# Patient Record
Sex: FEMALE | Race: WHITE | NOT HISPANIC OR LATINO | Employment: OTHER | URBAN - METROPOLITAN AREA
[De-identification: names, ages, dates, MRNs, and addresses within clinical notes are randomized per-mention and may not be internally consistent; named-entity substitution may affect disease eponyms.]

---

## 2018-06-28 PROBLEM — E87.70 LOCALIZED EDEMA DUE TO FLUID OVERLOAD: Status: ACTIVE | Noted: 2018-06-28

## 2018-06-28 PROBLEM — E11.65 TYPE 2 DIABETES MELLITUS WITH HYPERGLYCEMIA, WITHOUT LONG-TERM CURRENT USE OF INSULIN (HCC): Status: ACTIVE | Noted: 2018-06-28

## 2018-06-28 PROBLEM — E11.65 UNCONTROLLED TYPE 2 DIABETES MELLITUS WITH HYPERGLYCEMIA, WITHOUT LONG-TERM CURRENT USE OF INSULIN (HCC): Status: ACTIVE | Noted: 2018-06-28

## 2018-06-28 PROBLEM — R26.2 AMBULATORY DYSFUNCTION: Status: ACTIVE | Noted: 2018-06-28

## 2018-08-02 ENCOUNTER — IN HOME VISIT (OUTPATIENT)
Dept: GERIATRICS | Age: 83
End: 2018-08-02
Payer: MEDICARE

## 2018-08-02 VITALS
HEART RATE: 75 BPM | TEMPERATURE: 98.2 F | OXYGEN SATURATION: 97 % | RESPIRATION RATE: 20 BRPM | DIASTOLIC BLOOD PRESSURE: 84 MMHG | SYSTOLIC BLOOD PRESSURE: 112 MMHG

## 2018-08-02 DIAGNOSIS — K59.1 FUNCTIONAL DIARRHEA: Primary | ICD-10-CM

## 2018-08-02 DIAGNOSIS — E11.65 UNCONTROLLED TYPE 2 DIABETES MELLITUS WITH HYPERGLYCEMIA, WITHOUT LONG-TERM CURRENT USE OF INSULIN (HCC): ICD-10-CM

## 2018-08-02 PROCEDURE — 99335 PR DOM/R-HOME E/M EST PT LW MOD SEVERITY 25 MINUTES: CPT | Performed by: INTERNAL MEDICINE

## 2018-08-02 NOTE — PROGRESS NOTES
Assessment/Plan:    Functional diarrhea  Continue prn lopermide for now  Resident also had sertraline started few months back which can be causing the symptoms  She also on high dose of metformin will try decreasing it  Uncontrolled type 2 diabetes mellitus with hyperglycemia, without long-term current use of insulin ([facility] [address] )  Residents had her repaglinide d/c already due to episodes of hypoglycemia in the am  Since discontinuing it her blood sugars are improved in the am  And now since decreasing the metformin will increase lantus to 17 units  Continue monitor blood sugars  Subjective:      Patient ID: Laura Yo is a 82 y o  female  HPI    The following portions of the patient's history were reviewed and updated as appropriate: allergies, current medications, past family history, past medical history, past social history, past surgical history and problem list     Review of Systems   Constitutional:        Due to dementia resident forgets about any problem she has   Gastrointestinal:        Staff reports she has at least 3 bowel movements a day and very loose but not watery  All other systems reviewed and are negative  Objective:      /84 (BP Location: Right arm, Patient Position: Sitting)   Pulse 75   Temp 98 2 °F (36 8 °C)   Resp 20   SpO2 97%          Physical Exam   Constitutional: She appears well-developed and well-nourished  No distress  HENT:   Head: Normocephalic and atraumatic  Right Ear: External ear normal    Left Ear: External ear normal    Nose: Nose normal    Eyes: Conjunctivae and EOM are normal  Pupils are equal, round, and reactive to light  Right eye exhibits no discharge  Left eye exhibits no discharge  Neck: Normal range of motion  Neck supple  No JVD present  No tracheal deviation present  No thyromegaly present  Cardiovascular: Normal rate, regular rhythm, S1 normal and S2 normal     Murmur heard     Crescendo decrescendo systolic murmur is present with a grade of 2/6   Pulmonary/Chest: Effort normal and breath sounds normal  No respiratory distress  She has no wheezes  She has no rales  Abdominal: Soft  Bowel sounds are normal  She exhibits no distension  There is no tenderness  There is no rebound  Musculoskeletal:   Bilateral lower extremity edema about +2 pitting in nature  Neurological: She is alert  No cranial nerve deficit  Skin: Skin is warm and dry  No rash noted  She is not diaphoretic  No erythema  Psychiatric: Her affect is blunt  Her speech is delayed  She is slowed  Cognition and memory are impaired  She exhibits a depressed mood  She exhibits abnormal recent memory and abnormal remote memory

## 2018-08-02 NOTE — ASSESSMENT & PLAN NOTE
Residents had her repaglinide d/c already due to episodes of hypoglycemia in the am  Since discontinuing it her blood sugars are improved in the am  And now since decreasing the metformin will increase lantus to 17 units  Continue monitor blood sugars

## 2018-08-02 NOTE — ASSESSMENT & PLAN NOTE
Continue prn lopermide for now  Resident also had sertraline started few months back which can be causing the symptoms  She also on high dose of metformin will try decreasing it

## 2018-10-25 ENCOUNTER — IN HOME VISIT (OUTPATIENT)
Dept: GERIATRICS | Age: 83
End: 2018-10-25
Payer: MEDICARE

## 2018-10-25 VITALS
TEMPERATURE: 98.7 F | HEART RATE: 83 BPM | DIASTOLIC BLOOD PRESSURE: 74 MMHG | OXYGEN SATURATION: 99 % | SYSTOLIC BLOOD PRESSURE: 148 MMHG | RESPIRATION RATE: 20 BRPM

## 2018-10-25 DIAGNOSIS — E11.65 UNCONTROLLED TYPE 2 DIABETES MELLITUS WITH HYPERGLYCEMIA, WITHOUT LONG-TERM CURRENT USE OF INSULIN (HCC): Primary | ICD-10-CM

## 2018-10-25 DIAGNOSIS — E87.70 LOCALIZED EDEMA DUE TO FLUID OVERLOAD: ICD-10-CM

## 2018-10-25 PROCEDURE — 99336 PR DOM/R-HOME E/M EST PT MOD HI SEVERITY 40 MINUTES: CPT | Performed by: INTERNAL MEDICINE

## 2018-10-25 NOTE — ASSESSMENT & PLAN NOTE
Blood sugars reviewed which show she is still running high  Discussed with family about food and they are aware of it that the food they bring at times makes the sugar go up but states that at times she does not eat food at the facility because she does not like it  Informed them that increase the lantus to 20units  Will continue to monitor blood sugars

## 2018-10-25 NOTE — ASSESSMENT & PLAN NOTE
Increased lasix to 40mg po daily x 2 weeks then go back down to 20mg po daily  Will continue with the stockings  Also ordered a BMP in about 2 weeks

## 2018-10-25 NOTE — PROGRESS NOTES
Assessment/Plan:    Uncontrolled type 2 diabetes mellitus with hyperglycemia, without long-term current use of insulin ([facility])  Blood sugars reviewed which show she is still running high  Discussed with family about food and they are aware of it that the food they bring at times makes the sugar go up but states that at times she does not eat food at the facility because she does not like it  Informed them that increase the lantus to 20units  Will continue to monitor blood sugars  Localized edema due to fluid overload  Increased lasix to 40mg po daily x 2 weeks then go back down to 20mg po daily  Will continue with the stockings  Also ordered a BMP in about 2 weeks  Subjective:      Patient ID: Shahram Britton is a 82 y o  female  Resident seen for a routine visit  Patient states she is depressed because she is not home  Family reports at times she is depressed  Patient otherwise has no complaints  The following portions of the patient's history were reviewed and updated as appropriate: allergies, current medications, past family history, past medical history, past social history, past surgical history and problem list     Review of Systems   All other systems reviewed and are negative  Objective:      /74 (BP Location: Left arm, Patient Position: Sitting)   Pulse 83   Temp 98 7 °F (37 1 °C)   Resp 20   SpO2 99%          Physical Exam   Constitutional: She appears well-developed and well-nourished  No distress  HENT:   Head: Normocephalic and atraumatic  Right Ear: External ear normal    Left Ear: External ear normal    Nose: Nose normal    Eyes: Pupils are equal, round, and reactive to light  Conjunctivae and EOM are normal  Right eye exhibits no discharge  Left eye exhibits no discharge  Neck: Normal range of motion  Neck supple  No JVD present  No tracheal deviation present  No thyromegaly present     Cardiovascular: Normal rate, regular rhythm, S1 normal and S2 normal     Murmur heard  Crescendo decrescendo systolic murmur is present with a grade of 2/6   Musculoskeletal:   Bilateral lower extremity +1 to +2 pitting edema    Neurological: She is alert  No cranial nerve deficit  Skin: Skin is warm and dry  No rash noted  She is not diaphoretic  No erythema  Psychiatric: Thought content normal  Her speech is delayed  She is withdrawn  Cognition and memory are impaired  She expresses inappropriate judgment  She exhibits a depressed mood  She expresses no suicidal ideation  She expresses no suicidal plans  She exhibits abnormal recent memory and abnormal remote memory

## 2018-11-08 DIAGNOSIS — E11.65 UNCONTROLLED TYPE 2 DIABETES MELLITUS WITH HYPERGLYCEMIA, WITHOUT LONG-TERM CURRENT USE OF INSULIN (HCC): ICD-10-CM

## 2018-11-08 RX ORDER — ASPIRIN 81 MG
TABLET, DELAYED RELEASE (ENTERIC COATED) ORAL
Qty: 28 TABLET | Refills: 11 | Status: SHIPPED | OUTPATIENT
Start: 2018-11-08 | End: 2019-10-16 | Stop reason: SDUPTHER

## 2018-11-12 DIAGNOSIS — E11.8 TYPE 2 DIABETES MELLITUS WITH COMPLICATION, WITH LONG-TERM CURRENT USE OF INSULIN (HCC): Primary | ICD-10-CM

## 2018-11-12 DIAGNOSIS — Z79.4 TYPE 2 DIABETES MELLITUS WITH COMPLICATION, WITH LONG-TERM CURRENT USE OF INSULIN (HCC): Primary | ICD-10-CM

## 2018-11-13 RX ORDER — INSULIN GLARGINE 100 [IU]/ML
INJECTION, SOLUTION SUBCUTANEOUS
Qty: 10 ML | Refills: 11 | Status: SHIPPED | OUTPATIENT
Start: 2018-11-13 | End: 2019-01-22 | Stop reason: SDUPTHER

## 2018-11-14 DIAGNOSIS — I10 ESSENTIAL HYPERTENSION: ICD-10-CM

## 2018-11-14 DIAGNOSIS — E11.65 UNCONTROLLED TYPE 2 DIABETES MELLITUS WITH HYPERGLYCEMIA, WITHOUT LONG-TERM CURRENT USE OF INSULIN (HCC): ICD-10-CM

## 2018-11-14 DIAGNOSIS — K21.9 GASTROESOPHAGEAL REFLUX DISEASE WITHOUT ESOPHAGITIS: Primary | ICD-10-CM

## 2018-11-14 DIAGNOSIS — E78.5 HYPERLIPIDEMIA, UNSPECIFIED HYPERLIPIDEMIA TYPE: ICD-10-CM

## 2018-11-14 RX ORDER — LISINOPRIL 5 MG/1
TABLET ORAL DAILY
Qty: 28 TABLET | Refills: 11 | Status: SHIPPED | OUTPATIENT
Start: 2018-11-14 | End: 2019-11-13 | Stop reason: SDUPTHER

## 2018-11-14 RX ORDER — FUROSEMIDE 20 MG/1
TABLET ORAL
Qty: 28 TABLET | Refills: 11 | Status: SHIPPED | OUTPATIENT
Start: 2018-11-14 | End: 2019-11-13 | Stop reason: SDUPTHER

## 2018-11-14 RX ORDER — GEMFIBROZIL 600 MG/1
TABLET, FILM COATED ORAL
Qty: 56 TABLET | Refills: 11 | Status: SHIPPED | OUTPATIENT
Start: 2018-11-14 | End: 2019-11-13 | Stop reason: SDUPTHER

## 2018-11-14 RX ORDER — OMEPRAZOLE 20 MG/1
CAPSULE, DELAYED RELEASE ORAL DAILY
Qty: 28 CAPSULE | Refills: 11 | Status: SHIPPED | OUTPATIENT
Start: 2018-11-14 | End: 2019-11-13 | Stop reason: SDUPTHER

## 2018-11-14 RX ORDER — DIPHENOXYLATE HYDROCHLORIDE AND ATROPINE SULFATE 2.5; .025 MG/1; MG/1
1 TABLET ORAL DAILY
Qty: 28 TABLET | Refills: 11 | Status: SHIPPED | OUTPATIENT
Start: 2018-11-14 | End: 2019-11-13 | Stop reason: SDUPTHER

## 2018-11-14 RX ORDER — GLIPIZIDE 5 MG/1
TABLET ORAL
Qty: 112 TABLET | Refills: 11 | Status: SHIPPED | OUTPATIENT
Start: 2018-11-14 | End: 2019-11-13 | Stop reason: SDUPTHER

## 2018-11-19 LAB — HBA1C MFR BLD HPLC: 8.9 %

## 2019-01-22 ENCOUNTER — OFFICE VISIT (OUTPATIENT)
Dept: GERIATRICS | Age: 84
End: 2019-01-22
Payer: MEDICARE

## 2019-01-22 VITALS
RESPIRATION RATE: 20 BRPM | TEMPERATURE: 99.8 F | SYSTOLIC BLOOD PRESSURE: 150 MMHG | OXYGEN SATURATION: 95 % | HEART RATE: 102 BPM | DIASTOLIC BLOOD PRESSURE: 68 MMHG

## 2019-01-22 DIAGNOSIS — Z79.4 TYPE 2 DIABETES MELLITUS WITH COMPLICATION, WITH LONG-TERM CURRENT USE OF INSULIN (HCC): ICD-10-CM

## 2019-01-22 DIAGNOSIS — N18.30 CHRONIC KIDNEY DISEASE (CKD), STAGE III (MODERATE) (HCC): ICD-10-CM

## 2019-01-22 DIAGNOSIS — Z79.4 CONTROLLED TYPE 2 DIABETES MELLITUS WITH HYPERGLYCEMIA, WITH LONG-TERM CURRENT USE OF INSULIN (HCC): Primary | ICD-10-CM

## 2019-01-22 DIAGNOSIS — E11.8 TYPE 2 DIABETES MELLITUS WITH COMPLICATION, WITH LONG-TERM CURRENT USE OF INSULIN (HCC): ICD-10-CM

## 2019-01-22 DIAGNOSIS — F33.9 EPISODE OF RECURRENT MAJOR DEPRESSIVE DISORDER, UNSPECIFIED DEPRESSION EPISODE SEVERITY (HCC): ICD-10-CM

## 2019-01-22 DIAGNOSIS — E11.65 CONTROLLED TYPE 2 DIABETES MELLITUS WITH HYPERGLYCEMIA, WITH LONG-TERM CURRENT USE OF INSULIN (HCC): Primary | ICD-10-CM

## 2019-01-22 PROBLEM — F32.A DEPRESSION: Status: ACTIVE | Noted: 2019-01-22

## 2019-01-22 PROCEDURE — 99336 PR DOM/R-HOME E/M EST PT MOD HI SEVERITY 40 MINUTES: CPT | Performed by: INTERNAL MEDICINE

## 2019-01-22 RX ORDER — INSULIN GLARGINE 100 [IU]/ML
36 INJECTION, SOLUTION SUBCUTANEOUS DAILY
Qty: 10 ML | Refills: 0
Start: 2019-01-22 | End: 2019-04-23

## 2019-01-22 NOTE — ASSESSMENT & PLAN NOTE
Discussed with patient about starting a medication but refused  She was started on Sertaline before and had weakness with fall when it was discontinued  Family stated that she was very good last few days that they have visit and did not seem depressed  Staff have reported the same

## 2019-01-22 NOTE — ASSESSMENT & PLAN NOTE
Lab Results   Component Value Date    HGBA1C 8 9 11/19/2018       No results for input(s): POCGLU in the last 72 hours  Blood Sugar Average: Last 72 hrs: Increase lantus to 36 units subcut daily, continue sliding scale for now and will discontinue once alittle more stable with the blood sugars  Discontinued metformin due to her CKD  Continue monitoring blood sugars  Also diet compliance will help because at times patient does have food that is not diabetic  Called and talked to family about the patient's status and condition  Answered questions presented

## 2019-01-22 NOTE — PROGRESS NOTES
Assessment/Plan:    Controlled type 2 diabetes mellitus with hyperglycemia, with long-term current use of insulin ([facility])  Lab Results   Component Value Date    HGBA1C 8 9 11/19/2018       No results for input(s): POCGLU in the last 72 hours  Blood Sugar Average: Last 72 hrs: Increase lantus to 36 units subcut daily, continue sliding scale for now and will discontinue once alittle more stable with the blood sugars  Discontinued metformin due to her CKD  Continue monitoring blood sugars  Also diet compliance will help because at times patient does have food that is not diabetic  Called and talked to family about the patient's status and condition  Answered questions presented  Chronic kidney disease (CKD), stage III (moderate) ([facility])  Most likely secondary to diabetes will continue to monitor  Depression  Discussed with patient about starting a medication but refused  She was started on Sertaline before and had weakness with fall when it was discontinued  Family stated that she was very good last few days that they have visit and did not seem depressed  Staff have reported the same  Subjective:      Patient ID: Chevy Yip is a 82 y o  female  83 yo female seen due to uncontrolled diabetes  Patient does not provide much history but seems depressed when asked how she is doing  She started to cry and she continued to have on and off episodes of crying  Patient denied any thoughts of her hurting herself  She stated she does not know why she is crying  As per family patient's blood sugars have been very high but patient had no complaints of any symptoms  The following portions of the patient's history were reviewed and updated as appropriate: allergies, current medications, past family history, past medical history, past social history, past surgical history and problem list     Review of Systems   Cardiovascular: Positive for leg swelling     Psychiatric/Behavioral: Negative for suicidal ideas  All other systems reviewed and are negative  Objective:      /68 (BP Location: Left arm, Patient Position: Sitting)   Pulse 102   Temp 99 8 °F (37 7 °C)   Resp 20   SpO2 95%          Physical Exam   Constitutional: She appears well-developed and well-nourished  No distress  HENT:   Head: Normocephalic and atraumatic  Right Ear: External ear normal    Left Ear: External ear normal    Nose: Nose normal    Eyes: Pupils are equal, round, and reactive to light  Conjunctivae and EOM are normal  Right eye exhibits no discharge  Left eye exhibits no discharge  Neck: Normal range of motion  Neck supple  No JVD present  No tracheal deviation present  No thyromegaly present  Cardiovascular: Normal rate and regular rhythm  Murmur heard  Crescendo decrescendo systolic murmur is present with a grade of 2/6   Pulmonary/Chest: Effort normal and breath sounds normal  No respiratory distress  She has no wheezes  She has no rales  Abdominal: Bowel sounds are normal  She exhibits no distension  There is no tenderness  There is no rebound  Musculoskeletal: She exhibits edema  Neurological: She is alert  No cranial nerve deficit  Skin: Skin is warm and dry  No rash noted  She is not diaphoretic  No erythema  Psychiatric: Thought content normal  Her speech is delayed  She is slowed  Cognition and memory are impaired  She expresses impulsivity  She exhibits a depressed mood  She expresses no suicidal ideation  She expresses no suicidal plans and no homicidal plans  She exhibits abnormal recent memory and abnormal remote memory

## 2019-01-30 ENCOUNTER — TELEPHONE (OUTPATIENT)
Dept: GERIATRICS | Age: 84
End: 2019-01-30

## 2019-01-31 ENCOUNTER — IN HOME VISIT (OUTPATIENT)
Dept: GERIATRICS | Age: 84
End: 2019-01-31
Payer: MEDICARE

## 2019-01-31 VITALS
OXYGEN SATURATION: 97 % | HEART RATE: 78 BPM | TEMPERATURE: 98 F | SYSTOLIC BLOOD PRESSURE: 140 MMHG | DIASTOLIC BLOOD PRESSURE: 78 MMHG | RESPIRATION RATE: 18 BRPM

## 2019-01-31 DIAGNOSIS — F33.9 EPISODE OF RECURRENT MAJOR DEPRESSIVE DISORDER, UNSPECIFIED DEPRESSION EPISODE SEVERITY (HCC): ICD-10-CM

## 2019-01-31 DIAGNOSIS — E11.65 UNCONTROLLED TYPE 2 DIABETES MELLITUS WITH HYPERGLYCEMIA, WITHOUT LONG-TERM CURRENT USE OF INSULIN (HCC): Primary | ICD-10-CM

## 2019-01-31 PROCEDURE — 99336 PR DOM/R-HOME E/M EST PT MOD HI SEVERITY 40 MINUTES: CPT | Performed by: INTERNAL MEDICINE

## 2019-01-31 NOTE — ASSESSMENT & PLAN NOTE
Lab Results   Component Value Date    HGBA1C 8 9 11/19/2018       No results for input(s): POCGLU in the last 72 hours  Blood Sugar Average: Last 72 hrs:    Patient's blood sugar still running high  Patient has been receiving frequent doses of sliding scale  Blood sugars reviewed from facility and talked to family about the blood sugar levels  Increase lantus to 42 units and start scheduled novolog at lunch and dinner 5units  Will continue sliding scale for now  Once blood sugars stable will stop sliding scale  Continue glipizide also  Continue monitoring blood sugars

## 2019-01-31 NOTE — PROGRESS NOTES
Assessment/Plan:    Uncontrolled type 2 diabetes mellitus with hyperglycemia, without long-term current use of insulin ([facility])  Lab Results   Component Value Date    HGBA1C 8 9 11/19/2018       No results for input(s): POCGLU in the last 72 hours  Blood Sugar Average: Last 72 hrs:    Patient's blood sugar still running high  Patient has been receiving frequent doses of sliding scale  Blood sugars reviewed from facility and talked to family about the blood sugar levels  Increase lantus to 42 units and start scheduled novolog at lunch and dinner 5units  Will continue sliding scale for now  Once blood sugars stable will stop sliding scale  Continue glipizide also  Continue monitoring blood sugars  Depression  Patients mood seems to better  Staff reports she is doing well no issues with depressed feeling since the move to the dementia unit  Subjective:      Patient ID: Moises Coulter is a 82 y o  female  81 yo female seen due to uncontrolled blood sugars  Been contacted by staff and family throughout the week that the patient's blood sugars has been very high  Her sugars at times above 400  Patient denies any symptoms  Patient does not have any fever as per staff or any signs of infection  They have moved her to dementia unit to try to provide better care and closer monitoring of her diet  The following portions of the patient's history were reviewed and updated as appropriate: allergies, current medications, past family history, past medical history, past social history, past surgical history and problem list     Review of Systems   Constitutional: Negative  HENT: Negative  Eyes: Negative  Respiratory: Negative  Cardiovascular: Negative  Gastrointestinal: Negative  Endocrine: Negative  Genitourinary: Negative  Musculoskeletal: Negative  Skin: Negative  Neurological: Negative  Hematological: Negative  Psychiatric/Behavioral: Negative  Objective:      /78 (BP Location: Left arm, Patient Position: Sitting)   Pulse 78   Temp 98 °F (36 7 °C)   Resp 18   SpO2 97%          Physical Exam   Constitutional: She appears well-developed and well-nourished  No distress  HENT:   Head: Normocephalic and atraumatic  Right Ear: External ear normal    Left Ear: External ear normal    Nose: Nose normal    Eyes: Pupils are equal, round, and reactive to light  Conjunctivae and EOM are normal  Right eye exhibits no discharge  Left eye exhibits no discharge  Neck: Normal range of motion  Neck supple  No JVD present  No tracheal deviation present  No thyromegaly present  Cardiovascular: Normal rate and regular rhythm  Murmur heard  Crescendo decrescendo systolic murmur is present with a grade of 2/6   Pulmonary/Chest: Effort normal and breath sounds normal  No respiratory distress  She has no wheezes  She has no rales  Abdominal: Soft  Bowel sounds are normal  She exhibits no distension  There is no tenderness  There is no rebound  Musculoskeletal: She exhibits edema  Neurological: She is alert  No cranial nerve deficit  Skin: Skin is warm and dry  No rash noted  She is not diaphoretic  No erythema  Psychiatric: She has a normal mood and affect  Her speech is delayed  She is slowed  Cognition and memory are impaired  She exhibits abnormal recent memory and abnormal remote memory

## 2019-01-31 NOTE — ASSESSMENT & PLAN NOTE
Patients mood seems to better  Staff reports she is doing well no issues with depressed feeling since the move to the dementia unit

## 2019-03-01 DIAGNOSIS — E11.40 TYPE 2 DIABETES MELLITUS WITH DIABETIC NEUROPATHY, WITH LONG-TERM CURRENT USE OF INSULIN (HCC): Primary | ICD-10-CM

## 2019-03-01 DIAGNOSIS — Z79.4 TYPE 2 DIABETES MELLITUS WITH DIABETIC NEUROPATHY, WITH LONG-TERM CURRENT USE OF INSULIN (HCC): Primary | ICD-10-CM

## 2019-03-15 DIAGNOSIS — E11.42 TYPE 2 DIABETES MELLITUS WITH DIABETIC POLYNEUROPATHY, WITHOUT LONG-TERM CURRENT USE OF INSULIN (HCC): Primary | ICD-10-CM

## 2019-03-15 RX ORDER — LANCETS 28 GAUGE
EACH MISCELLANEOUS
Qty: 100 EACH | Refills: 0 | Status: SHIPPED | OUTPATIENT
Start: 2019-03-15 | End: 2019-07-10 | Stop reason: SDUPTHER

## 2019-04-16 DIAGNOSIS — Z79.4 TYPE 2 DIABETES MELLITUS WITH HYPOGLYCEMIA WITHOUT COMA, WITH LONG-TERM CURRENT USE OF INSULIN (HCC): Primary | ICD-10-CM

## 2019-04-16 DIAGNOSIS — E11.649 TYPE 2 DIABETES MELLITUS WITH HYPOGLYCEMIA WITHOUT COMA, WITH LONG-TERM CURRENT USE OF INSULIN (HCC): Primary | ICD-10-CM

## 2019-04-17 RX ORDER — SYRING-NEEDL,DISP,INSUL,0.3 ML 29 G X1/2"
SYRINGE, EMPTY DISPOSABLE MISCELLANEOUS
Qty: 100 EACH | Refills: 0 | Status: SHIPPED | OUTPATIENT
Start: 2019-04-17 | End: 2019-06-18 | Stop reason: SDUPTHER

## 2019-04-23 ENCOUNTER — IN HOME VISIT (OUTPATIENT)
Dept: GERIATRICS | Facility: CLINIC | Age: 84
End: 2019-04-23
Payer: MEDICARE

## 2019-04-23 DIAGNOSIS — Z79.4 TYPE 2 DIABETES MELLITUS WITH COMPLICATION, WITH LONG-TERM CURRENT USE OF INSULIN (HCC): ICD-10-CM

## 2019-04-23 DIAGNOSIS — E87.70 LOCALIZED EDEMA DUE TO FLUID OVERLOAD: ICD-10-CM

## 2019-04-23 DIAGNOSIS — E11.65 CONTROLLED TYPE 2 DIABETES MELLITUS WITH HYPERGLYCEMIA, WITH LONG-TERM CURRENT USE OF INSULIN (HCC): Primary | ICD-10-CM

## 2019-04-23 DIAGNOSIS — E11.8 TYPE 2 DIABETES MELLITUS WITH COMPLICATION, WITH LONG-TERM CURRENT USE OF INSULIN (HCC): ICD-10-CM

## 2019-04-23 DIAGNOSIS — R26.2 AMBULATORY DYSFUNCTION: ICD-10-CM

## 2019-04-23 DIAGNOSIS — Z79.4 CONTROLLED TYPE 2 DIABETES MELLITUS WITH HYPERGLYCEMIA, WITH LONG-TERM CURRENT USE OF INSULIN (HCC): Primary | ICD-10-CM

## 2019-04-23 PROCEDURE — 99337 PR DOM/R-HOME E/M EST PT SIGNIF NEW PROB 60 MINUTES: CPT | Performed by: INTERNAL MEDICINE

## 2019-04-23 RX ORDER — INSULIN GLARGINE 100 [IU]/ML
44 INJECTION, SOLUTION SUBCUTANEOUS DAILY
Qty: 10 ML | Refills: 0
Start: 2019-04-23 | End: 2020-05-07

## 2019-05-17 DIAGNOSIS — R21 RASH: Primary | ICD-10-CM

## 2019-05-17 RX ORDER — ANORECTAL OINTMENT 15.7; .44; 24; 20.6 G/100G; G/100G; G/100G; G/100G
OINTMENT TOPICAL
Qty: 113 G | Refills: 4 | Status: SHIPPED | OUTPATIENT
Start: 2019-05-17 | End: 2020-07-29

## 2019-05-20 LAB — HBA1C MFR BLD HPLC: 7.5 %

## 2019-06-08 DIAGNOSIS — E11.65 CONTROLLED TYPE 2 DIABETES MELLITUS WITH HYPERGLYCEMIA, WITH LONG-TERM CURRENT USE OF INSULIN (HCC): Primary | ICD-10-CM

## 2019-06-08 DIAGNOSIS — Z79.4 CONTROLLED TYPE 2 DIABETES MELLITUS WITH HYPERGLYCEMIA, WITH LONG-TERM CURRENT USE OF INSULIN (HCC): Primary | ICD-10-CM

## 2019-06-08 RX ORDER — DIAPER,BRIEF,ADULT, DISPOSABLE
EACH MISCELLANEOUS
Qty: 100 EACH | Refills: 11 | Status: SHIPPED | OUTPATIENT
Start: 2019-06-08 | End: 2020-07-06

## 2019-06-18 DIAGNOSIS — E11.649 TYPE 2 DIABETES MELLITUS WITH HYPOGLYCEMIA WITHOUT COMA, WITH LONG-TERM CURRENT USE OF INSULIN (HCC): ICD-10-CM

## 2019-06-18 DIAGNOSIS — Z79.4 TYPE 2 DIABETES MELLITUS WITH HYPOGLYCEMIA WITHOUT COMA, WITH LONG-TERM CURRENT USE OF INSULIN (HCC): ICD-10-CM

## 2019-06-18 RX ORDER — SYRING-NEEDL,DISP,INSUL,0.3 ML 29 G X1/2"
SYRINGE, EMPTY DISPOSABLE MISCELLANEOUS
Qty: 100 EACH | Refills: 0 | Status: SHIPPED | OUTPATIENT
Start: 2019-06-18 | End: 2020-01-02

## 2019-06-20 ENCOUNTER — TELEPHONE (OUTPATIENT)
Dept: OTHER | Facility: OTHER | Age: 84
End: 2019-06-20

## 2019-06-28 ENCOUNTER — TELEPHONE (OUTPATIENT)
Dept: GERIATRICS | Age: 84
End: 2019-06-28

## 2019-06-28 NOTE — TELEPHONE ENCOUNTER
ROSITA FROM [facility] CALLED TO REPORT A BLOOD SUGAR  DONE AT 11:20AM  5 UNITS STRAIGHT ORDER IS BEING GIVEN AND 5 UNITS COVERAGE  PATIENTS BLOOD SUGAR WAS ALSO ELEVATED THIS MORNING BEFORE MEAL   ROSITA CAN BE REACHED [phone]

## 2019-07-10 DIAGNOSIS — E11.42 TYPE 2 DIABETES MELLITUS WITH DIABETIC POLYNEUROPATHY, WITHOUT LONG-TERM CURRENT USE OF INSULIN (HCC): ICD-10-CM

## 2019-07-10 RX ORDER — LANCETS 28 GAUGE
EACH MISCELLANEOUS
Qty: 100 EACH | Refills: 0 | Status: SHIPPED | OUTPATIENT
Start: 2019-07-10 | End: 2020-05-21

## 2019-07-18 ENCOUNTER — TELEPHONE (OUTPATIENT)
Dept: OTHER | Facility: OTHER | Age: 84
End: 2019-07-18

## 2019-07-22 ENCOUNTER — TELEPHONE (OUTPATIENT)
Dept: OTHER | Facility: OTHER | Age: 84
End: 2019-07-22

## 2019-07-24 ENCOUNTER — TELEPHONE (OUTPATIENT)
Dept: GERIATRICS | Facility: OTHER | Age: 84
End: 2019-07-24

## 2019-07-24 NOTE — TELEPHONE ENCOUNTER
Blood sugar was 438 when checked  Protocol is to notify Provider if greater than 400  Gave her 5+5 unit of Humalog

## 2019-07-25 ENCOUNTER — IN HOME VISIT (OUTPATIENT)
Dept: GERIATRICS | Facility: CLINIC | Age: 84
End: 2019-07-25
Payer: MEDICARE

## 2019-07-25 DIAGNOSIS — E11.65 CONTROLLED TYPE 2 DIABETES MELLITUS WITH HYPERGLYCEMIA, WITH LONG-TERM CURRENT USE OF INSULIN (HCC): Primary | ICD-10-CM

## 2019-07-25 DIAGNOSIS — Z79.4 CONTROLLED TYPE 2 DIABETES MELLITUS WITH HYPERGLYCEMIA, WITH LONG-TERM CURRENT USE OF INSULIN (HCC): Primary | ICD-10-CM

## 2019-07-25 DIAGNOSIS — I10 ESSENTIAL HYPERTENSION: ICD-10-CM

## 2019-07-25 DIAGNOSIS — E87.70 LOCALIZED EDEMA DUE TO FLUID OVERLOAD: ICD-10-CM

## 2019-07-25 PROCEDURE — 99336 PR DOM/R-HOME E/M EST PT MOD HI SEVERITY 40 MINUTES: CPT | Performed by: INTERNAL MEDICINE

## 2019-07-25 NOTE — ASSESSMENT & PLAN NOTE
Lab Results   Component Value Date    HGBA1C 7 5 05/20/2019     HbA1c improved according May 2019  But blood sugars as of late have been running high at the facility  Patient blood sugars are at times in the 400s and 300s  Will need to be very cautious about increasing her medications because she still at times have blood sugars in the 70s few times in the last month    Will continue all current meds but increase the scheduled humalog to 10 units twice a day with lunch and dinner   Continue monitoring blood sugars

## 2019-07-25 NOTE — PROGRESS NOTES
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    NAME: Corky Forrest  AGE: 82 y o  SEX: female    DATE OF ENCOUNTER: 7/25/2019    Assessment and Plan   Controlled type 2 diabetes mellitus with hyperglycemia, with long-term current use of insulin ([address] )  Lab Results   Component Value Date    HGBA1C 7 5 05/20/2019     HbA1c improved according May 2019  But blood sugars as of late have been running high at the facility  Patient blood sugars are at times in the 400s and 300s  Will need to be very cautious about increasing her medications because she still at times have blood sugars in the 70s few times in the last month  Will continue all current meds but increase the scheduled humalog to 10 units twice a day with lunch and dinner   Continue monitoring blood sugars      Localized edema due to fluid overload  Continue lasix   Still has edema but also going to urine frequently and patient currently soiled during exam  Continue to elevate legs as much as possible  Also continue TEDs if patient tolerates and allows it  Continue monitoring kidney function    Essential hypertension  BP good range  At times SBP is in the 140s but still acceptable  Continue current meds  If continues to go up will adjust meds  Continue monitoring as scheduled  Chief Complaint     No complaints    History of Present Illness     83 yo female seen for her diabetes, hypertension and edema  Staff have contacted several times the office stating her blood sugars have been running in the 400s or 300s but patient usually is asymptomatic  Patient unable to give any details due to her dementia but can answer simple questions  Does go to the bathroom frequently as per staff       PMHx     Past Medical History:   Diagnosis Date    Anxiety     Dementia     Depression     DM2 (diabetes mellitus, type 2) ([facility])     GERD (gastroesophageal reflux disease)     Hypertension     Obesity      Past Surgical History:   Procedure Laterality Date    TOTAL ABDOMINAL HYSTERECTOMY No family history on file  Social History     Socioeconomic History    Marital status:       Spouse name: Not on file    Number of children: Not on file    Years of education: Not on file    Highest education level: Not on file   Occupational History    Not on file   Social Needs    Financial resource strain: Not on file    Food insecurity:     Worry: Not on file     Inability: Not on file    Transportation needs:     Medical: Not on file     Non-medical: Not on file   Tobacco Use    Smoking status: Former Smoker   Substance and Sexual Activity    Alcohol use: Not on file    Drug use: Not on file    Sexual activity: Not on file   Lifestyle    Physical activity:     Days per week: Not on file     Minutes per session: Not on file    Stress: Not on file   Relationships    Social connections:     Talks on phone: Not on file     Gets together: Not on file     Attends Zoroastrian service: Not on file     Active member of club or organization: Not on file     Attends meetings of clubs or organizations: Not on file     Relationship status: Not on file    Intimate partner violence:     Fear of current or ex partner: Not on file     Emotionally abused: Not on file     Physically abused: Not on file     Forced sexual activity: Not on file   Other Topics Concern    Not on file   Social History Narrative    Not on file     Allergies   Allergen Reactions    No Active Allergies        Review of Systems     Denies any pain, nausea, vomiting  All other review of system negative        Objective   Vital signs: (taken by me)  BP: 122/55  HR: 67  RR: 20  TEMP: 97 8F  SAT : 96%    PHYSICAL EXAM:  GENERAL: no acute distress  SKIN: warm, dry, no rash, no cyanosis  HEENT: normocephalic, atraumatic, no JVD, no Thyromegaly, no lymphadenopathy  LUNGS: CTA, no wheezing, no rales, expanded equally, no chest tenderness   HEART: normal rhythm, normal rate, systolic 2/6 murmur, no gallop  ABDOMEN: soft non tender non distended bs+, no guarding or rebound tenderness  :  no suprapubic tenderness  MUSCULOSKELETAL: strength about 4/5 all extremities, bilateral lower leg edema pitting +1, no calf tenderness  NEUROLOGY: awake, alert, CN2-12 intact  PSYCH: cooperative, pleasant  Delayed speech and response, flat affect, impaired memory         Pertinent Laboratory/Diagnostic Studies:  Recent labs and diagnostic tests reviewed in EMR    Current Medications   Medications reviewed in EMR and facility

## 2019-07-25 NOTE — ASSESSMENT & PLAN NOTE
BP good range  At times SBP is in the 140s but still acceptable  Continue current meds  If continues to go up will adjust meds  Continue monitoring as scheduled

## 2019-08-29 ENCOUNTER — IN HOME VISIT (OUTPATIENT)
Dept: GERIATRICS | Facility: CLINIC | Age: 84
End: 2019-08-29
Payer: MEDICARE

## 2019-08-29 DIAGNOSIS — E87.70 LOCALIZED EDEMA DUE TO FLUID OVERLOAD: ICD-10-CM

## 2019-08-29 DIAGNOSIS — F33.9 EPISODE OF RECURRENT MAJOR DEPRESSIVE DISORDER, UNSPECIFIED DEPRESSION EPISODE SEVERITY (HCC): ICD-10-CM

## 2019-08-29 DIAGNOSIS — E11.65 UNCONTROLLED TYPE 2 DIABETES MELLITUS WITH HYPERGLYCEMIA, WITHOUT LONG-TERM CURRENT USE OF INSULIN (HCC): Primary | ICD-10-CM

## 2019-08-29 PROCEDURE — 99336 PR DOM/R-HOME E/M EST PT MOD HI SEVERITY 40 MINUTES: CPT | Performed by: INTERNAL MEDICINE

## 2019-08-29 NOTE — PROGRESS NOTES
[facility]    NAME: Wilton   AGE: 82 y o  SEX: female    DATE OF ENCOUNTER: 8/29/2019    Assessment and Plan   Uncontrolled type 2 diabetes mellitus with hyperglycemia, without long-term current use of insulin ([facility])  Blood sugars have been going up  Now frequently 200s, some 300s and even 400s at times  Will increase lantus to 48 units   Continue all other meds  Continue monitoring blood sugars  Discussed with family about the blood sugars and also about diet  Depression  Will discuss with family about starting meds  But last time it was started patient ambulation got worst on the SSRI  Continue monitoring for worsening symptoms  Localized edema due to fluid overload  Continue to have edema   Continue elevation and TEDs as much as possible will discuss with family about increasing meds  But family concerned from before about patient being soiled frequently        Chief Complaint     No complaints    History of Present Illness     83 yo female seen as due to uncontrolled blood sugars  As per staff patient blood sugars have gone up  She at times runs in the 200s, 300s and even 400s  Patient cannot give much details due to her cognition  Staff do report she does at times have snacks in her room  Family do also bring some snacks at times  They do report trying to control what they bring in and not leave much food for her to snack on  Staff reports no other new symptoms from her blood sugars being out of control  The blood sugar have gotten worst over the last month even more  PMHx     Past Medical History:   Diagnosis Date    Anxiety     Dementia     Depression     DM2 (diabetes mellitus, type 2) ([facility])     GERD (gastroesophageal reflux disease)     Hypertension     Obesity      Past Surgical History:   Procedure Laterality Date    TOTAL ABDOMINAL HYSTERECTOMY       No family history on file  Social History     Socioeconomic History    Marital status:       Spouse name: Not on file    Number of children: Not on file    Years of education: Not on file    Highest education level: Not on file   Occupational History    Not on file   Social Needs    Financial resource strain: Not on file    Food insecurity:     Worry: Not on file     Inability: Not on file    Transportation needs:     Medical: Not on file     Non-medical: Not on file   Tobacco Use    Smoking status: Former Smoker   Substance and Sexual Activity    Alcohol use: Not on file    Drug use: Not on file    Sexual activity: Not on file   Lifestyle    Physical activity:     Days per week: Not on file     Minutes per session: Not on file    Stress: Not on file   Relationships    Social connections:     Talks on phone: Not on file     Gets together: Not on file     Attends Sikh service: Not on file     Active member of club or organization: Not on file     Attends meetings of clubs or organizations: Not on file     Relationship status: Not on file    Intimate partner violence:     Fear of current or ex partner: Not on file     Emotionally abused: Not on file     Physically abused: Not on file     Forced sexual activity: Not on file   Other Topics Concern    Not on file   Social History Narrative    Not on file     Allergies   Allergen Reactions    No Active Allergies        Review of Systems       All other review of system negative        Objective   Vital signs: (taken by me)  BP: 128/60  HR: 77  RR: 18  TEMP: 97 7F  SAT : 97%    PHYSICAL EXAM:  GENERAL: initially seen crying, talked a little and calmed down after   SKIN: warm, dry, no rash, no cyanosis  HEENT: normocephalic, atraumatic, no JVD, no Thyromegaly, no lymphadenopathy  LUNGS: CTA, no wheezing, no rales, expanded equally, no chest tenderness   HEART: normal rhythm, normal rate, systolic 2/6 murmur, no gallop  ABDOMEN: soft non tender non distended bs+, no guarding or rebound tenderness  :  no suprapubic tenderness  MUSCULOSKELETAL: strength about 4/5 all extremities, bilateral lower leg edema pitting +2, no calf tenderness  NEUROLOGY: awake, alert,CN2-12 intact     PSYCH: cooperative, pleasant, depressed, impaired memory       Pertinent Laboratory/Diagnostic Studies:  Recent labs and diagnostic tests reviewed in EMR    Current Medications   Medications reviewed in EMR and facility

## 2019-08-29 NOTE — ASSESSMENT & PLAN NOTE
Will discuss with family about starting meds  But last time it was started patient ambulation got worst on the SSRI  Continue monitoring for worsening symptoms

## 2019-08-29 NOTE — ASSESSMENT & PLAN NOTE
Blood sugars have been going up  Now frequently 200s, some 300s and even 400s at times  Will increase lantus to 48 units   Continue all other meds  Continue monitoring blood sugars  Discussed with family about the blood sugars and also about diet

## 2019-08-29 NOTE — ASSESSMENT & PLAN NOTE
Continue to have edema   Continue elevation and TEDs as much as possible will discuss with family about increasing meds  But family concerned from before about patient being soiled frequently

## 2019-09-28 ENCOUNTER — TELEPHONE (OUTPATIENT)
Dept: OTHER | Facility: OTHER | Age: 84
End: 2019-09-28

## 2019-09-29 NOTE — TELEPHONE ENCOUNTER
Eduardo text @ 2047  [phone]/Colt/Nigel Prince/Pt  Kerrie Norwood/1935/Reason high blood sugar 461

## 2019-10-16 DIAGNOSIS — E11.65 UNCONTROLLED TYPE 2 DIABETES MELLITUS WITH HYPERGLYCEMIA, WITHOUT LONG-TERM CURRENT USE OF INSULIN (HCC): ICD-10-CM

## 2019-10-16 RX ORDER — ASPIRIN 81 MG/1
TABLET, COATED ORAL
Qty: 28 TABLET | Refills: 0 | Status: SHIPPED | OUTPATIENT
Start: 2019-10-16 | End: 2019-12-13 | Stop reason: SDUPTHER

## 2019-11-13 DIAGNOSIS — K21.9 GASTROESOPHAGEAL REFLUX DISEASE WITHOUT ESOPHAGITIS: ICD-10-CM

## 2019-11-13 DIAGNOSIS — E78.5 HYPERLIPIDEMIA, UNSPECIFIED HYPERLIPIDEMIA TYPE: ICD-10-CM

## 2019-11-13 DIAGNOSIS — E11.65 UNCONTROLLED TYPE 2 DIABETES MELLITUS WITH HYPERGLYCEMIA, WITHOUT LONG-TERM CURRENT USE OF INSULIN (HCC): ICD-10-CM

## 2019-11-13 DIAGNOSIS — I10 ESSENTIAL HYPERTENSION: ICD-10-CM

## 2019-11-13 RX ORDER — OMEPRAZOLE 20 MG/1
CAPSULE, DELAYED RELEASE ORAL DAILY
Qty: 28 CAPSULE | Refills: 0 | Status: SHIPPED | OUTPATIENT
Start: 2019-11-13 | End: 2019-12-30 | Stop reason: SDUPTHER

## 2019-11-13 RX ORDER — LISINOPRIL 5 MG/1
TABLET ORAL DAILY
Qty: 28 TABLET | Refills: 0 | Status: SHIPPED | OUTPATIENT
Start: 2019-11-13 | End: 2019-12-30 | Stop reason: SDUPTHER

## 2019-11-13 RX ORDER — DIPHENOXYLATE HYDROCHLORIDE AND ATROPINE SULFATE 2.5; .025 MG/1; MG/1
1 TABLET ORAL DAILY
Qty: 28 TABLET | Refills: 0 | Status: SHIPPED | OUTPATIENT
Start: 2019-11-13 | End: 2019-12-30 | Stop reason: SDUPTHER

## 2019-11-13 RX ORDER — GLIPIZIDE 5 MG/1
TABLET ORAL
Qty: 112 TABLET | Refills: 0 | Status: SHIPPED | OUTPATIENT
Start: 2019-11-13 | End: 2019-12-30 | Stop reason: SDUPTHER

## 2019-11-13 RX ORDER — FUROSEMIDE 20 MG/1
TABLET ORAL
Qty: 28 TABLET | Refills: 0 | Status: SHIPPED | OUTPATIENT
Start: 2019-11-13 | End: 2020-01-13

## 2019-11-13 RX ORDER — GEMFIBROZIL 600 MG/1
TABLET, FILM COATED ORAL
Qty: 56 TABLET | Refills: 0 | Status: SHIPPED | OUTPATIENT
Start: 2019-11-13 | End: 2019-12-30 | Stop reason: SDUPTHER

## 2019-11-18 LAB — HBA1C MFR BLD HPLC: 8.1 %

## 2019-12-05 ENCOUNTER — IN HOME VISIT (OUTPATIENT)
Dept: GERIATRICS | Facility: CLINIC | Age: 84
End: 2019-12-05
Payer: MEDICARE

## 2019-12-05 DIAGNOSIS — E87.70 LOCALIZED EDEMA DUE TO FLUID OVERLOAD: ICD-10-CM

## 2019-12-05 DIAGNOSIS — N18.30 CHRONIC KIDNEY DISEASE (CKD), STAGE III (MODERATE) (HCC): ICD-10-CM

## 2019-12-05 DIAGNOSIS — I10 ESSENTIAL HYPERTENSION: ICD-10-CM

## 2019-12-05 DIAGNOSIS — E11.65 UNCONTROLLED TYPE 2 DIABETES MELLITUS WITH HYPERGLYCEMIA, WITHOUT LONG-TERM CURRENT USE OF INSULIN (HCC): Primary | ICD-10-CM

## 2019-12-05 PROCEDURE — 99337 PR DOM/R-HOME E/M EST PT SIGNIF NEW PROB 60 MINUTES: CPT | Performed by: INTERNAL MEDICINE

## 2019-12-05 NOTE — ASSESSMENT & PLAN NOTE
BMP done on 11/18/19 shows Cr  1 32 and GFR 38  Continue monitor will get BMP on 12/11/19  Multifactorial

## 2019-12-05 NOTE — PROGRESS NOTES
[facility]    NAME: Yane Del Castillo  AGE: 82 y o  SEX: female    DATE OF ENCOUNTER: 12/5/2019    Assessment and Plan   Uncontrolled type 2 diabetes mellitus with hyperglycemia, without long-term current use of insulin ([facility])    Lab Results   Component Value Date    HGBA1C 8 1 11/18/2019     Will decrease humolog at breakfast to 5 units due to low blood sugars prior to lunch, increase lunch time to 12 units due to dinner sugars being elevated and add another 5 units at dinner time   Continue all other meds   Continue monitoring blood sugars    Localized edema due to fluid overload  Will increase lasix to 40mg po daily x 1 week then go back to 20 mg po daily   Will also get a BMP due to being on increased dose of lasix to monitor kidney and potassium  Continue with stockings and elevation of the legs  Essential hypertension  BP seems to be slightly up but today seems to be good  With an increase in lasix will bring BP down while on it  Continue current meds  Continue monitoring BP    Chronic kidney disease (CKD), stage III (moderate) ([facility])  BMP done on 11/18/19 shows Cr  1 32 and GFR 38  Continue monitor will get BMP on 12/11/19  Multifactorial         Chief Complaint     No complaints     History of Present Illness     81 yo female seen for her follow up visit  Patient seen for her diabetes which is uncontrolled, edema, HTN and CKDIII  Discussed with nursing staff who stated that at times her blood sugars at noon is low but other ones are very high  Patient does have some food in her room  Patient has cognitive deficits as a result cannot give proper details  PMHx     Past Medical History:   Diagnosis Date    Anxiety     Dementia     Depression     DM2 (diabetes mellitus, type 2) ([facility])     GERD (gastroesophageal reflux disease)     Hypertension     Obesity      Past Surgical History:   Procedure Laterality Date    TOTAL ABDOMINAL HYSTERECTOMY       No family history on file    Social History Socioeconomic History    Marital status:       Spouse name: Not on file    Number of children: Not on file    Years of education: Not on file    Highest education level: Not on file   Occupational History    Not on file   Social Needs    Financial resource strain: Not on file    Food insecurity:     Worry: Not on file     Inability: Not on file    Transportation needs:     Medical: Not on file     Non-medical: Not on file   Tobacco Use    Smoking status: Former Smoker   Substance and Sexual Activity    Alcohol use: Not on file    Drug use: Not on file    Sexual activity: Not on file   Lifestyle    Physical activity:     Days per week: Not on file     Minutes per session: Not on file    Stress: Not on file   Relationships    Social connections:     Talks on phone: Not on file     Gets together: Not on file     Attends Moravian service: Not on file     Active member of club or organization: Not on file     Attends meetings of clubs or organizations: Not on file     Relationship status: Not on file    Intimate partner violence:     Fear of current or ex partner: Not on file     Emotionally abused: Not on file     Physically abused: Not on file     Forced sexual activity: Not on file   Other Topics Concern    Not on file   Social History Narrative    Not on file     Allergies   Allergen Reactions    No Active Allergies        Review of Systems     All other review of system negative      Objective   Vital signs: (taken by me)  BP: 132/60  HR: 77  RR: 20  TEMP: 99 1F  SAT : 99%    PHYSICAL EXAM:  GENERAL: no acute distress  SKIN: warm, dry, no rash, no cyanosis  HEENT: normocephalic, atraumatic, no JVD, no Thyromegaly, no lymphadenopathy  LUNGS: CTA, no wheezing, no rales, expanded equally, no chest tenderness   HEART: normal rhythm, normal rate, systolic murmur, no gallop  ABDOMEN: soft non tender non distended bs+, no guarding or rebound tenderness  :  no suprapubic tenderness  MUSCULOSKELETAL: strength about 4/5 all extremities, bilateral lower leg edema pitting +2, no calf tenderness  NEUROLOGY: awake, alert,  CN2-12 intact  PSYCH: cooperative, pleasant   Impaired memory      Pertinent Laboratory/Diagnostic Studies:  Recent labs and diagnostic tests reviewed in EMR    Current Medications   Medications reviewed in EMR and facility

## 2019-12-05 NOTE — ASSESSMENT & PLAN NOTE
Lab Results   Component Value Date    HGBA1C 8 1 11/18/2019     Will decrease humolog at breakfast to 5 units due to low blood sugars prior to lunch, increase lunch time to 12 units due to dinner sugars being elevated and add another 5 units at dinner time   Continue all other meds   Continue monitoring blood sugars

## 2019-12-05 NOTE — ASSESSMENT & PLAN NOTE
BP seems to be slightly up but today seems to be good  With an increase in lasix will bring BP down while on it  Continue current meds  Continue monitoring BP

## 2019-12-05 NOTE — ASSESSMENT & PLAN NOTE
Will increase lasix to 40mg po daily x 1 week then go back to 20 mg po daily   Will also get a BMP due to being on increased dose of lasix to monitor kidney and potassium  Continue with stockings and elevation of the legs

## 2019-12-13 DIAGNOSIS — E11.65 UNCONTROLLED TYPE 2 DIABETES MELLITUS WITH HYPERGLYCEMIA, WITHOUT LONG-TERM CURRENT USE OF INSULIN (HCC): ICD-10-CM

## 2019-12-13 RX ORDER — ASPIRIN 81 MG/1
TABLET, COATED ORAL
Qty: 28 TABLET | Refills: 0 | Status: SHIPPED | OUTPATIENT
Start: 2019-12-13 | End: 2020-05-29

## 2019-12-30 DIAGNOSIS — I10 ESSENTIAL HYPERTENSION: ICD-10-CM

## 2019-12-30 DIAGNOSIS — E78.5 HYPERLIPIDEMIA, UNSPECIFIED HYPERLIPIDEMIA TYPE: ICD-10-CM

## 2019-12-30 DIAGNOSIS — E11.65 UNCONTROLLED TYPE 2 DIABETES MELLITUS WITH HYPERGLYCEMIA, WITHOUT LONG-TERM CURRENT USE OF INSULIN (HCC): ICD-10-CM

## 2019-12-30 DIAGNOSIS — K21.9 GASTROESOPHAGEAL REFLUX DISEASE WITHOUT ESOPHAGITIS: ICD-10-CM

## 2019-12-30 RX ORDER — LISINOPRIL 5 MG/1
TABLET ORAL DAILY
Qty: 28 TABLET | Refills: 0 | Status: SHIPPED | OUTPATIENT
Start: 2019-12-30 | End: 2020-06-01

## 2019-12-30 RX ORDER — DIPHENOXYLATE HYDROCHLORIDE AND ATROPINE SULFATE 2.5; .025 MG/1; MG/1
1 TABLET ORAL DAILY
Qty: 28 TABLET | Refills: 0 | Status: SHIPPED | OUTPATIENT
Start: 2019-12-30 | End: 2020-07-02 | Stop reason: SDUPTHER

## 2019-12-30 RX ORDER — GEMFIBROZIL 600 MG/1
600 TABLET, FILM COATED ORAL 2 TIMES DAILY
Qty: 56 TABLET | Refills: 0 | Status: SHIPPED | OUTPATIENT
Start: 2019-12-30 | End: 2020-06-01

## 2019-12-30 RX ORDER — GLIPIZIDE 5 MG/1
TABLET ORAL
Qty: 112 TABLET | Refills: 0 | Status: SHIPPED | OUTPATIENT
Start: 2019-12-30 | End: 2020-06-01

## 2019-12-30 RX ORDER — GEMFIBROZIL 600 MG/1
TABLET, FILM COATED ORAL
Qty: 56 TABLET | Refills: 0 | Status: SHIPPED | OUTPATIENT
Start: 2019-12-30 | End: 2019-12-30 | Stop reason: SDUPTHER

## 2019-12-30 RX ORDER — OMEPRAZOLE 20 MG/1
CAPSULE, DELAYED RELEASE ORAL DAILY
Qty: 28 CAPSULE | Refills: 0 | Status: SHIPPED | OUTPATIENT
Start: 2019-12-30 | End: 2020-05-29

## 2020-01-02 DIAGNOSIS — Z79.4 TYPE 2 DIABETES MELLITUS WITH HYPOGLYCEMIA WITHOUT COMA, WITH LONG-TERM CURRENT USE OF INSULIN (HCC): ICD-10-CM

## 2020-01-02 DIAGNOSIS — E11.649 TYPE 2 DIABETES MELLITUS WITH HYPOGLYCEMIA WITHOUT COMA, WITH LONG-TERM CURRENT USE OF INSULIN (HCC): ICD-10-CM

## 2020-01-02 RX ORDER — SYRING-NEEDL,DISP,INSUL,0.3 ML 29 G X1/2"
SYRINGE, EMPTY DISPOSABLE MISCELLANEOUS
Qty: 100 EACH | Refills: 0 | Status: SHIPPED | OUTPATIENT
Start: 2020-01-02 | End: 2020-03-16

## 2020-01-10 DIAGNOSIS — I10 ESSENTIAL HYPERTENSION: ICD-10-CM

## 2020-01-13 RX ORDER — FUROSEMIDE 20 MG/1
TABLET ORAL
Qty: 28 TABLET | Refills: 0 | Status: SHIPPED | OUTPATIENT
Start: 2020-01-13 | End: 2020-05-29

## 2020-02-07 DIAGNOSIS — Z79.4 TYPE 2 DIABETES MELLITUS WITH DIABETIC NEUROPATHY, WITH LONG-TERM CURRENT USE OF INSULIN (HCC): ICD-10-CM

## 2020-02-07 DIAGNOSIS — E11.40 TYPE 2 DIABETES MELLITUS WITH DIABETIC NEUROPATHY, WITH LONG-TERM CURRENT USE OF INSULIN (HCC): ICD-10-CM

## 2020-03-12 ENCOUNTER — IN HOME VISIT (OUTPATIENT)
Dept: GERIATRICS | Facility: CLINIC | Age: 85
End: 2020-03-12
Payer: MEDICARE

## 2020-03-12 DIAGNOSIS — L03.115 CELLULITIS OF RIGHT LOWER EXTREMITY: Primary | ICD-10-CM

## 2020-03-12 DIAGNOSIS — N18.30 CHRONIC KIDNEY DISEASE (CKD), STAGE III (MODERATE) (HCC): ICD-10-CM

## 2020-03-12 DIAGNOSIS — E87.70 LOCALIZED EDEMA DUE TO FLUID OVERLOAD: ICD-10-CM

## 2020-03-12 PROCEDURE — 99336 PR DOM/R-HOME E/M EST PT MOD HI SEVERITY 40 MINUTES: CPT | Performed by: INTERNAL MEDICINE

## 2020-03-12 NOTE — ASSESSMENT & PLAN NOTE
Increased lasix to 40mg po daily x 2 weeks then go back down to 20mg po daily  Will get a BMP to check k+ level  Continue monitoring swelling

## 2020-03-12 NOTE — ASSESSMENT & PLAN NOTE
Was started on keflex  Legs seems to be getting better  Redness improved as per staff  Will complete the course  Edema also not helping with the healing process

## 2020-03-12 NOTE — PROGRESS NOTES
TANISHAVANESSA YEEOR    NAME: Marivel Meza  AGE: 82 y o  SEX: female    DATE OF ENCOUNTER: 3/12/2020    Assessment and Plan   Cellulitis of right lower extremity  Was started on keflex  Legs seems to be getting better  Redness improved as per staff  Will complete the course  Edema also not helping with the healing process    Localized edema due to fluid overload  Increased lasix to 40mg po daily x 2 weeks then go back down to 20mg po daily  Will get a BMP to check k+ level  Continue monitoring swelling     Chronic kidney disease (CKD), stage III (moderate) ([facility])  Will check BMP in about 2 weeks  Continue monitoring   Up dated the family about the visit and testing       Chief Complaint     No complaint from patient    History of Present Illness     81 yo female seen as per request by staff and family  Patient is seen due to redness and discomfort of the right leg  It started about 2 weeks ago  When staff and family contacted stated it was warm and painful to touch  Patient was started on antibiotics  Patient cannot give much details due to her cognition  PMHx     Past Medical History:   Diagnosis Date    Anxiety     Dementia     Depression     DM2 (diabetes mellitus, type 2) ([facility])     GERD (gastroesophageal reflux disease)     Hypertension     Obesity      Past Surgical History:   Procedure Laterality Date    TOTAL ABDOMINAL HYSTERECTOMY       No family history on file  Social History     Socioeconomic History    Marital status:       Spouse name: Not on file    Number of children: Not on file    Years of education: Not on file    Highest education level: Not on file   Occupational History    Not on file   Social Needs    Financial resource strain: Not on file    Food insecurity:     Worry: Not on file     Inability: Not on file    Transportation needs:     Medical: Not on file     Non-medical: Not on file   Tobacco Use    Smoking status: Former Smoker   Substance and Sexual Activity    Alcohol use: Not on file    Drug use: Not on file    Sexual activity: Not on file   Lifestyle    Physical activity:     Days per week: Not on file     Minutes per session: Not on file    Stress: Not on file   Relationships    Social connections:     Talks on phone: Not on file     Gets together: Not on file     Attends Yarsanism service: Not on file     Active member of club or organization: Not on file     Attends meetings of clubs or organizations: Not on file     Relationship status: Not on file    Intimate partner violence:     Fear of current or ex partner: Not on file     Emotionally abused: Not on file     Physically abused: Not on file     Forced sexual activity: Not on file   Other Topics Concern    Not on file   Social History Narrative    Not on file     Allergies   Allergen Reactions    No Active Allergies        Review of Systems     Denies any pain   All other review of system negative        Objective   Vital signs: (taken by me)  BP: 140/55  HR: 74  RR: 18  TEMP: 98 2F  SAT : 96%    PHYSICAL EXAM:  GENERAL: no acute distress  SKIN: warm, dry, no rash, no cyanosis  HEENT: normocephalic, atraumatic, no JVD, no Thyromegaly, no lymphadenopathy  LUNGS: CTA, no wheezing, no rales, expanded equally, no chest tenderness   HEART: normal rhythm, normal rate, sysotlic murmur, no gallop  ABDOMEN: soft non tender non distended bs+, no guarding or rebound tenderness  :  no suprapubic tenderness  MUSCULOSKELETAL: strength about 4/5 all extremities,  bilateral lower leg edema pitting +2, no calf tenderness  NEUROLOGY: awake, alert,  CN2-12 intact  PSYCH: cooperative, pleasant   Impaired memory      Pertinent Laboratory/Diagnostic Studies:  Recent labs and diagnostic tests reviewed in EMR    Current Medications   Medications reviewed in EMR and facility

## 2020-03-12 NOTE — ASSESSMENT & PLAN NOTE
Will check BMP in about 2 weeks  Continue monitoring   Up dated the family about the visit and testing

## 2020-03-14 DIAGNOSIS — E11.649 TYPE 2 DIABETES MELLITUS WITH HYPOGLYCEMIA WITHOUT COMA, WITH LONG-TERM CURRENT USE OF INSULIN (HCC): ICD-10-CM

## 2020-03-14 DIAGNOSIS — Z79.4 TYPE 2 DIABETES MELLITUS WITH HYPOGLYCEMIA WITHOUT COMA, WITH LONG-TERM CURRENT USE OF INSULIN (HCC): ICD-10-CM

## 2020-03-16 RX ORDER — SYRINGE,NEEDLE,INSULN,SF 0.5ML 29 G X1/2"
SYRINGE, EMPTY DISPOSABLE MISCELLANEOUS
Qty: 100 EACH | Refills: 5 | Status: SHIPPED | OUTPATIENT
Start: 2020-03-16 | End: 2020-05-21

## 2020-04-20 DIAGNOSIS — R21 RASH: Primary | ICD-10-CM

## 2020-04-20 RX ORDER — PRENATAL VIT 91/IRON/FOLIC/DHA 28-975-200
COMBINATION PACKAGE (EA) ORAL
Qty: 30 G | Refills: 0 | Status: SHIPPED | OUTPATIENT
Start: 2020-04-20 | End: 2020-08-27 | Stop reason: ALTCHOICE

## 2020-05-02 DIAGNOSIS — R21 RASH: Primary | ICD-10-CM

## 2020-05-04 RX ORDER — NYSTATIN 100000 [USP'U]/G
POWDER TOPICAL
Qty: 60 G | Refills: 5 | Status: SHIPPED | OUTPATIENT
Start: 2020-05-04 | End: 2020-10-28

## 2020-05-07 ENCOUNTER — TELEMEDICINE (OUTPATIENT)
Dept: GERIATRICS | Facility: CLINIC | Age: 85
End: 2020-05-07
Payer: MEDICARE

## 2020-05-07 DIAGNOSIS — N18.30 CHRONIC KIDNEY DISEASE (CKD), STAGE III (MODERATE) (HCC): ICD-10-CM

## 2020-05-07 DIAGNOSIS — E87.70 LOCALIZED EDEMA DUE TO FLUID OVERLOAD: ICD-10-CM

## 2020-05-07 DIAGNOSIS — J96.01 ACUTE RESPIRATORY FAILURE WITH HYPOXIA (HCC): ICD-10-CM

## 2020-05-07 DIAGNOSIS — F02.80 LATE ONSET ALZHEIMER'S DISEASE WITHOUT BEHAVIORAL DISTURBANCE (HCC): ICD-10-CM

## 2020-05-07 DIAGNOSIS — E11.65 UNCONTROLLED TYPE 2 DIABETES MELLITUS WITH HYPERGLYCEMIA, WITHOUT LONG-TERM CURRENT USE OF INSULIN (HCC): Primary | ICD-10-CM

## 2020-05-07 DIAGNOSIS — G30.1 LATE ONSET ALZHEIMER'S DISEASE WITHOUT BEHAVIORAL DISTURBANCE (HCC): ICD-10-CM

## 2020-05-07 PROBLEM — F03.90 DEMENTIA (HCC): Status: ACTIVE | Noted: 2020-05-07

## 2020-05-07 PROCEDURE — 99443 PR PHYS/QHP TELEPHONE EVALUATION 21-30 MIN: CPT | Performed by: INTERNAL MEDICINE

## 2020-05-07 RX ORDER — INSULIN GLARGINE 100 [IU]/ML
50 INJECTION, SOLUTION SUBCUTANEOUS DAILY
Qty: 10 ML | Refills: 0
Start: 2020-05-07 | End: 2020-05-21

## 2020-05-18 LAB — HBA1C MFR BLD HPLC: 7.3 %

## 2020-05-21 DIAGNOSIS — E11.42 TYPE 2 DIABETES MELLITUS WITH DIABETIC POLYNEUROPATHY, WITHOUT LONG-TERM CURRENT USE OF INSULIN (HCC): ICD-10-CM

## 2020-05-21 DIAGNOSIS — E11.65 UNCONTROLLED TYPE 2 DIABETES MELLITUS WITH HYPERGLYCEMIA, WITHOUT LONG-TERM CURRENT USE OF INSULIN (HCC): ICD-10-CM

## 2020-05-21 DIAGNOSIS — Z79.4 TYPE 2 DIABETES MELLITUS WITH HYPOGLYCEMIA WITHOUT COMA, WITH LONG-TERM CURRENT USE OF INSULIN (HCC): ICD-10-CM

## 2020-05-21 DIAGNOSIS — E11.649 TYPE 2 DIABETES MELLITUS WITH HYPOGLYCEMIA WITHOUT COMA, WITH LONG-TERM CURRENT USE OF INSULIN (HCC): ICD-10-CM

## 2020-05-21 RX ORDER — INSULIN GLARGINE 100 [IU]/ML
INJECTION, SOLUTION SUBCUTANEOUS
Qty: 10 ML | Refills: 3 | Status: SHIPPED | OUTPATIENT
Start: 2020-05-21 | End: 2020-08-27

## 2020-05-21 RX ORDER — SYRINGE,NEEDLE,INSULN,SF 0.5ML 29 G X1/2"
SYRINGE, EMPTY DISPOSABLE MISCELLANEOUS
Qty: 100 EACH | Refills: 2 | Status: SHIPPED | OUTPATIENT
Start: 2020-05-21 | End: 2020-11-12

## 2020-05-21 RX ORDER — LANCETS 28 GAUGE
EACH MISCELLANEOUS
Qty: 100 EACH | Refills: 3 | Status: SHIPPED | OUTPATIENT
Start: 2020-05-21 | End: 2020-10-02

## 2020-05-28 DIAGNOSIS — E11.65 UNCONTROLLED TYPE 2 DIABETES MELLITUS WITH HYPERGLYCEMIA, WITHOUT LONG-TERM CURRENT USE OF INSULIN (HCC): ICD-10-CM

## 2020-05-28 DIAGNOSIS — K21.9 GASTROESOPHAGEAL REFLUX DISEASE WITHOUT ESOPHAGITIS: ICD-10-CM

## 2020-05-28 DIAGNOSIS — Z78.9 TAKES DIETARY SUPPLEMENTS: Primary | ICD-10-CM

## 2020-05-28 DIAGNOSIS — I10 ESSENTIAL HYPERTENSION: ICD-10-CM

## 2020-05-29 RX ORDER — MULTIVIT-MIN/FA/LYCOPEN/LUTEIN 500-300MCG
TABLET ORAL
Qty: 30 TABLET | Refills: 3 | Status: SHIPPED | OUTPATIENT
Start: 2020-05-29 | End: 2020-09-29

## 2020-05-29 RX ORDER — OMEPRAZOLE 20 MG/1
CAPSULE, DELAYED RELEASE ORAL
Qty: 30 CAPSULE | Refills: 3 | Status: SHIPPED | OUTPATIENT
Start: 2020-05-29 | End: 2020-09-29

## 2020-05-29 RX ORDER — ASPIRIN 81 MG/1
TABLET ORAL
Qty: 30 TABLET | Refills: 3 | Status: SHIPPED | OUTPATIENT
Start: 2020-05-29 | End: 2020-09-29

## 2020-05-29 RX ORDER — FUROSEMIDE 20 MG/1
TABLET ORAL
Qty: 30 TABLET | Refills: 3 | Status: SHIPPED | OUTPATIENT
Start: 2020-05-29 | End: 2020-08-27 | Stop reason: DRUGHIGH

## 2020-05-30 DIAGNOSIS — E78.5 HYPERLIPIDEMIA, UNSPECIFIED HYPERLIPIDEMIA TYPE: ICD-10-CM

## 2020-05-30 DIAGNOSIS — I10 ESSENTIAL HYPERTENSION: ICD-10-CM

## 2020-05-30 DIAGNOSIS — E11.65 UNCONTROLLED TYPE 2 DIABETES MELLITUS WITH HYPERGLYCEMIA, WITHOUT LONG-TERM CURRENT USE OF INSULIN (HCC): ICD-10-CM

## 2020-06-01 RX ORDER — LISINOPRIL 5 MG/1
TABLET ORAL
Qty: 30 TABLET | Refills: 3 | Status: SHIPPED | OUTPATIENT
Start: 2020-06-01 | End: 2020-09-29

## 2020-06-01 RX ORDER — GEMFIBROZIL 600 MG/1
TABLET, FILM COATED ORAL
Qty: 60 TABLET | Refills: 3 | Status: SHIPPED | OUTPATIENT
Start: 2020-06-01 | End: 2020-09-29

## 2020-06-01 RX ORDER — GLIPIZIDE 5 MG/1
TABLET ORAL
Qty: 120 TABLET | Refills: 3 | Status: SHIPPED | OUTPATIENT
Start: 2020-06-01 | End: 2020-09-29

## 2020-06-22 DIAGNOSIS — E11.40 TYPE 2 DIABETES MELLITUS WITH DIABETIC NEUROPATHY, WITH LONG-TERM CURRENT USE OF INSULIN (HCC): ICD-10-CM

## 2020-06-22 DIAGNOSIS — Z79.4 TYPE 2 DIABETES MELLITUS WITH DIABETIC NEUROPATHY, WITH LONG-TERM CURRENT USE OF INSULIN (HCC): ICD-10-CM

## 2020-07-02 ENCOUNTER — TELEMEDICINE (OUTPATIENT)
Dept: GERIATRICS | Facility: CLINIC | Age: 85
End: 2020-07-02
Payer: MEDICARE

## 2020-07-02 DIAGNOSIS — F02.80 LATE ONSET ALZHEIMER'S DISEASE WITHOUT BEHAVIORAL DISTURBANCE (HCC): Primary | ICD-10-CM

## 2020-07-02 DIAGNOSIS — E11.65 UNCONTROLLED TYPE 2 DIABETES MELLITUS WITH HYPERGLYCEMIA, WITHOUT LONG-TERM CURRENT USE OF INSULIN (HCC): ICD-10-CM

## 2020-07-02 DIAGNOSIS — G30.1 LATE ONSET ALZHEIMER'S DISEASE WITHOUT BEHAVIORAL DISTURBANCE (HCC): Primary | ICD-10-CM

## 2020-07-02 DIAGNOSIS — F33.9 EPISODE OF RECURRENT MAJOR DEPRESSIVE DISORDER, UNSPECIFIED DEPRESSION EPISODE SEVERITY (HCC): ICD-10-CM

## 2020-07-02 PROCEDURE — 99336 PR DOM/R-HOME E/M EST PT MOD HI SEVERITY 40 MINUTES: CPT | Performed by: INTERNAL MEDICINE

## 2020-07-02 RX ORDER — LOPERAMIDE HYDROCHLORIDE 2 MG/1
CAPSULE ORAL
COMMUNITY
Start: 2020-06-18 | End: 2021-11-29

## 2020-07-02 RX ORDER — UBIQUINOL 100 MG
CAPSULE ORAL
COMMUNITY
Start: 2020-06-15 | End: 2021-03-04

## 2020-07-02 NOTE — PROGRESS NOTES
Virtual Regular Visit [facility]      Assessment/Plan:    Problem List Items Addressed This Visit        Endocrine    Uncontrolled type 2 diabetes mellitus with hyperglycemia, without long-term current use of insulin ([facility] )       Lab Results   Component Value Date    HGBA1C 7 3 (H) 05/18/2020     HbA1c acceptable for her chronic condition  Blood sugars reviewed they are elevated frequently but with her history of hypoglycemia and at times not eating well will not change for now  Continue monitoring blood sugars  Continue  Current meds  Up dated family about the results            Nervous and Auditory    Dementia ([facility] ) - Primary     Will get a UA and urine culture due to her history UTI   Also get CBC, CMP and TSH with reflex Free T4 but a lot of it maybe contribution from social isolation making her symptoms of depression worst also  Other    Depression     Will be cautious about starting medications because last time patient had medications she did not do well, she declined in ambulation   Continue supportive care   Denies any thoughts of hurting herself  Denies being depressed but patient seen crying at times  Family also report her being depressed  Up dated family about the visit  Continue monitoring symptoms  Reason for visit is   Chief Complaint   Patient presents with    Virtual Regular Visit        Encounter provider Alvaro Andersen MD    Provider located at [place] [address]  [address]  [facility] [id number] [phone]      Recent Visits  No visits were found meeting these conditions  Showing recent visits within past 7 days and meeting all other requirements     Future Appointments  No visits were found meeting these conditions  Showing future appointments within next 150 days and meeting all other requirements        The patient was identified by name and date of birth   Sheila Green was informed that this is a telemedicine visit and that the visit is being conducted through [employer] and patient was informed that this is not a secure, HIPAA-complaint platform  She agrees to proceed     My office door was closed  No one else was in the room  She acknowledged consent and understanding of privacy and security of the video platform  The patient has agreed to participate and understands they can discontinue the visit at any time  Patient is aware this is a billable service  Vic Conrad is a 82 y o  female seen as per request by family and staff due to increased confusion, agitation and crying  HPI   83 yo female seen for her increase in confusion, agitation and crying  Family reports several times they have tried to do video visit and window visit with her but she has been very angry and agitated  They report she is more confused and crying frequently  Patient denies any symptoms or depression  She is seen crying at times during the video visit       Past Medical History:   Diagnosis Date    Anxiety     Dementia     Depression     DM2 [facility]diabetes mellitus[facility] type 2) ([facility])     GERD (gastroesophageal reflux disease)     Hypertension     Obesity        Past Surgical History:   Procedure Laterality Date    TOTAL ABDOMINAL HYSTERECTOMY         Current Outpatient Medications   Medication Sig Dispense Refill    Alcohol Swabs (ALCOHOL PREP) 70 % PADS       aspirin (ECOTRIN LOW STRENGTH) 81 mg EC tablet TAKE ONE TABLET BY MOUTH EVERY DAY 30 tablet 3    CALMOSEPTINE 0 44-20 6 % OINT APPLY TOPICALLY TO BUTTOCKS AS NEEDED W/EACH INCONTINENT EPISODE 113 g 4    EASY TOUCH INSULIN SAFETY SYR 29G X 1/2" 0 5 ML MISC USE UP TO 6X/ each 2    Easy Touch Safety Lancets 28G MISC USE AS DIRECTED FOUR TIMES A  each 3    furosemide (LASIX) 20 mg tablet TAKE ONE TABLET BY MOUTH EVERY DAY 30 tablet 3    gemfibrozil (LOPID) 600 mg tablet TAKE ONE TABLET BY MOUTH TWO TIMES A DAY 60 tablet 3    glipiZIDE (GLUCOTROL) 5 mg tablet TAKE TWO TABLETS BY MOUTH TWO TIMES A  tablet 3    HUMALOG 100 UNIT/ML injection INJECT 7U IN AM ,12U AT LUNCH& 5U AT DINNER & PER SS 4X/DAY BEFORE MEALS & AT BEDTIME<200=0U 200-249=1U,250-299=2U,300-349=3U 350-400=4U>400 10 mL 3    LANTUS 100 UNIT/ML subcutaneous injection INJECT 50 UNITS SUBCUTANEOUSLY EVERY DAY *HOLD IF BG<150 OR IF PATIENT ISNT EATING* 10 mL 3    lisinopril (ZESTRIL) 5 mg tablet TAKE ONE TABLET BY MOUTH EVERY DAY 30 tablet 3    loperamide (IMODIUM) 2 mg capsule       MONOJECT INS SYR 1CC/29G 29G X 1/2" 1 ML MISC Inject 1 Syringe under the skin 5 (five) times a day      Multiple Vitamins-Minerals (SENTRY SENIOR) TABS TAKE ONE TABLET BY MOUTH EVERY DAY 30 tablet 3    NYSTATIN powder APPLY TO AFFECTED AREA TWO TIMES A DAY UNTIL HEALED 60 g 5    omeprazole (PriLOSEC) 20 mg delayed release capsule TAKE ONE CAPSULE BY MOUTH EVERY DAY 30 capsule 3    terbinafine (LamISIL) 1 % cream APPLY TOPICALLY TO FEET TWICE DAILY & UNDER AFFECTED BREAST ONCE DAILY FOR 3 WEEKS (END 4/28) 30 g 0    TRUETRACK TEST test strip TEST BLOOD GLUCOSE 4 TIMES A DAY BEFORE MEALS AND AT BEDTIME *SEND RESULTS AT THE END OF EACH MONTH*NOTIFY MD OR SEND FAX IF BS <70 OR >400* 100 each 11     No current facility-administered medications for this visit           Allergies   Allergen Reactions    No Active Allergies        Review of Systems   Denies any pain, depression  All review of system negative as per patient    Video Exam    Physical Exam   Vital signs:  BP: 104/42  HR: 90  RR: 18  TEMP: 97 9F  SAT : 92%    PHYSICAL EXAM:  GENERAL: no acute distress but seen crying at times  SKIN: no rash, no cyanosis  HEENT: normocephalic, atraumatic  LUNGS: expanded equally, no chest tenderness   HEART: no JVD, bilateral lower extremity edema   ABDOMEN: soft non tender non distended   MUSCULOSKELETAL:  moves all extremities, ROM within normal  NEUROLOGY: awake, alert, EOMI intact  PSYCH: cooperative, depressed, seems easily agitated    Time spend with patient care and discussing patient condition with family 45 minutes  VIRTUAL VISIT DISCLAIMER    Kerrie Abad acknowledges that she has consented to an online visit or consultation  She understands that the online visit is based solely on information provided by her, and that, in the absence of a face-to-face physical evaluation by the physician, the diagnosis she receives is both limited and provisional in terms of accuracy and completeness  This is not intended to replace a full medical face-to-face evaluation by the physician  Zeeshan Khan understands and accepts these terms

## 2020-07-02 NOTE — ASSESSMENT & PLAN NOTE
Will be cautious about starting medications because last time patient had medications she did not do well, she declined in ambulation   Continue supportive care   Denies any thoughts of hurting herself  Denies being depressed but patient seen crying at times  Family also report her being depressed  Up dated family about the visit  Continue monitoring symptoms

## 2020-07-02 NOTE — ASSESSMENT & PLAN NOTE
Lab Results   Component Value Date    HGBA1C 7 3 (H) 05/18/2020     HbA1c acceptable for her chronic condition  Blood sugars reviewed they are elevated frequently but with her history of hypoglycemia and at times not eating well will not change for now  Continue monitoring blood sugars  Continue  Current meds  Up dated family about the results

## 2020-07-02 NOTE — ASSESSMENT & PLAN NOTE
Will get a UA and urine culture due to her history UTI   Also get CBC, CMP and TSH with reflex Free T4 but a lot of it maybe contribution from social isolation making her symptoms of depression worst also

## 2020-07-06 DIAGNOSIS — Z79.4 CONTROLLED TYPE 2 DIABETES MELLITUS WITH HYPERGLYCEMIA, WITH LONG-TERM CURRENT USE OF INSULIN (HCC): ICD-10-CM

## 2020-07-06 DIAGNOSIS — E11.65 CONTROLLED TYPE 2 DIABETES MELLITUS WITH HYPERGLYCEMIA, WITH LONG-TERM CURRENT USE OF INSULIN (HCC): ICD-10-CM

## 2020-07-06 RX ORDER — BLOOD-GLUCOSE METER
KIT MISCELLANEOUS
Qty: 100 EACH | Refills: 3 | Status: SHIPPED | OUTPATIENT
Start: 2020-07-06 | End: 2021-01-05

## 2020-07-29 DIAGNOSIS — R21 RASH: ICD-10-CM

## 2020-07-29 RX ORDER — ANORECTAL OINTMENT 15.7; .44; 24; 20.6 G/100G; G/100G; G/100G; G/100G
OINTMENT TOPICAL
Qty: 113 G | Refills: 3 | Status: SHIPPED | OUTPATIENT
Start: 2020-07-29 | End: 2020-08-20

## 2020-08-20 DIAGNOSIS — R21 RASH: ICD-10-CM

## 2020-08-20 RX ORDER — ANORECTAL OINTMENT 15.7; .44; 24; 20.6 G/100G; G/100G; G/100G; G/100G
OINTMENT TOPICAL
Qty: 113 G | Refills: 3 | Status: SHIPPED | OUTPATIENT
Start: 2020-08-20

## 2020-08-27 ENCOUNTER — IN HOME VISIT (OUTPATIENT)
Dept: GERIATRICS | Facility: CLINIC | Age: 85
End: 2020-08-27
Payer: MEDICARE

## 2020-08-27 DIAGNOSIS — N18.30 CHRONIC KIDNEY DISEASE (CKD), STAGE III (MODERATE) (HCC): ICD-10-CM

## 2020-08-27 DIAGNOSIS — E11.65 UNCONTROLLED TYPE 2 DIABETES MELLITUS WITH HYPERGLYCEMIA, WITHOUT LONG-TERM CURRENT USE OF INSULIN (HCC): ICD-10-CM

## 2020-08-27 DIAGNOSIS — E87.70 LOCALIZED EDEMA DUE TO FLUID OVERLOAD: Primary | ICD-10-CM

## 2020-08-27 DIAGNOSIS — F33.9 EPISODE OF RECURRENT MAJOR DEPRESSIVE DISORDER, UNSPECIFIED DEPRESSION EPISODE SEVERITY (HCC): ICD-10-CM

## 2020-08-27 PROCEDURE — 99337 PR DOM/R-HOME E/M EST PT SIGNIF NEW PROB 60 MINUTES: CPT | Performed by: INTERNAL MEDICINE

## 2020-08-27 RX ORDER — TORSEMIDE 20 MG/1
20 TABLET ORAL DAILY
COMMUNITY
End: 2020-09-24

## 2020-08-27 RX ORDER — INSULIN GLARGINE 100 [IU]/ML
INJECTION, SOLUTION SUBCUTANEOUS
Qty: 10 ML | Refills: 3 | Status: SHIPPED | OUTPATIENT
Start: 2020-08-27 | End: 2020-09-18

## 2020-08-27 RX ORDER — ACETAMINOPHEN 325 MG/1
650 TABLET ORAL EVERY 4 HOURS PRN
COMMUNITY

## 2020-08-27 NOTE — PROGRESS NOTES
[facility]    NAME: Chevy Yip  AGE: 82 y o  SEX: female    DATE OF ENCOUNTER: 8/27/2020    Assessment and Plan   Uncontrolled type 2 diabetes mellitus with hyperglycemia, without long-term current use of insulin ([facility])    Lab Results   Component Value Date    HGBA1C 7 3 (H) 05/18/2020     Blood sugars better then before times not receiving her lantus and her blood sugars is going to high after  Will give a sliding scale order  If blood sugar > 150 give 50 units   If blood sugar equal< 150 but greater then 100 give 10 units  Continue monitoring blood sugar  Continue all other current meds    Localized edema due to fluid overload  Swelling is worst and patient having pain  And continues to have chronic problem with swelling   Will discontinue lasix and start torsemide  Will give torsemide 40mg po daily x 1 week then go to 20mg po daily  Will also get a BMP in 3 weeks to make sure kidney function is stable   Continue monitoring swelling   Called and updated family about patient condition    Chronic kidney disease (CKD), stage III (moderate) ([facility] )  Will get BMP in 3 weeks to monitor function    Depression  Patient is crying more now  Family also report when they visit she is more withdrawn and has crying spells  Continue current management  Last time patient was started on medications she had several falls  If continues to be a problem will discuss with family about medications  Chief Complaint     Leg pain, leg swelling as per staff    History of Present Illness     81 yo female seen as per staff request  Staff report patient has been having leg pain when she walks  Patient swelling has increased on both legs  Staff also report at times she does not get her lantus because of hold instructions then her sugars go up  They also report patient does have episodes of crying which is more frequent then before  Patient cannot give details due to her dementia       PMHx     Past Medical History:   Diagnosis Date    Anxiety     Dementia     Depression     DM2 (diabetes mellitus, type 2) ([facility])     GERD (gastroesophageal reflux disease)     Hypertension     Obesity      Past Surgical History:   Procedure Laterality Date    TOTAL ABDOMINAL HYSTERECTOMY       No family history on file  Social History     Socioeconomic History    Marital status:       Spouse name: Not on file    Number of children: Not on file    Years of education: Not on file    Highest education level: Not on file   Occupational History    Not on file   Social Needs    Financial resource strain: Not on file    Food insecurity     Worry: Not on file     Inability: Not on file    Transportation needs     Medical: Not on file     Non-medical: Not on file   Tobacco Use    Smoking status: Former Smoker   Substance and Sexual Activity    Alcohol use: Not on file    Drug use: Not on file    Sexual activity: Not on file   Lifestyle    Physical activity     Days per week: Not on file     Minutes per session: Not on file    Stress: Not on file   Relationships    Social connections     Talks on phone: Not on file     Gets together: Not on file     Attends Denominational service: Not on file     Active member of club or organization: Not on file     Attends meetings of clubs or organizations: Not on file     Relationship status: Not on file    Intimate partner violence     Fear of current or ex partner: Not on file     Emotionally abused: Not on file     Physically abused: Not on file     Forced sexual activity: Not on file   Other Topics Concern    Not on file   Social History Narrative    Not on file     Allergies   Allergen Reactions    No Active Allergies        Review of Systems     Denies any pain but when leg is touched even before squeezing grimaces in pain  Difficult to get ROS due to her dementia      Objective   Vital signs: (taken by me)  BP: 125/70  HR: 83  RR: 18  TEMP: 99 1F  SAT : 95% on RA    PHYSICAL EXAM:  GENERAL: no acute distress  SKIN: warm, dry, no rash, no cyanosis  HEENT: normocephalic, atraumatic, no JVD, no Thyromegaly, no lymphadenopathy  LUNGS: CTA, no wheezing, no rales, expanded equally, no chest tenderness   HEART: normal rhythm, normal rate, systolic murmur, no gallop  ABDOMEN: soft non tender non distended bs+, no guarding or rebound tenderness  :  no suprapubic tenderness  MUSCULOSKELETAL: strength about 4/5 all extremities, ROM within normal, bilateral lower leg edema pitting +3, no calf tenderness  NEUROLOGY: awake, alert  CN2-12 intact     PSYCH: cooperative, flat affect, depressed       Pertinent Laboratory/Diagnostic Studies:  Recent labs and diagnostic tests reviewed in EMR    Current Medications   Medications reviewed in EMR and facility

## 2020-08-27 NOTE — ASSESSMENT & PLAN NOTE
Swelling is worst and patient having pain  And continues to have chronic problem with swelling   Will discontinue lasix and start torsemide  Will give torsemide 40mg po daily x 1 week then go to 20mg po daily  Will also get a BMP in 3 weeks to make sure kidney function is stable   Continue monitoring swelling   Called and updated family about patient condition

## 2020-08-27 NOTE — ASSESSMENT & PLAN NOTE
Lab Results   Component Value Date    HGBA1C 7 3 (H) 05/18/2020     Blood sugars better then before times not receiving her lantus and her blood sugars is going to high after  Will give a sliding scale order  If blood sugar > 150 give 50 units   If blood sugar equal< 150 but greater then 100 give 10 units  Continue monitoring blood sugar  Continue all other current meds

## 2020-08-27 NOTE — ASSESSMENT & PLAN NOTE
Patient is crying more now  Family also report when they visit she is more withdrawn and has crying spells  Continue current management  Last time patient was started on medications she had several falls  If continues to be a problem will discuss with family about medications

## 2020-09-18 DIAGNOSIS — E11.65 UNCONTROLLED TYPE 2 DIABETES MELLITUS WITH HYPERGLYCEMIA, WITHOUT LONG-TERM CURRENT USE OF INSULIN (HCC): ICD-10-CM

## 2020-09-18 RX ORDER — INSULIN GLARGINE 100 [IU]/ML
INJECTION, SOLUTION SUBCUTANEOUS
Qty: 10 ML | Refills: 3 | Status: SHIPPED | OUTPATIENT
Start: 2020-09-18 | End: 2020-10-02

## 2020-09-24 DIAGNOSIS — E87.70 LOCALIZED EDEMA DUE TO FLUID OVERLOAD: Primary | ICD-10-CM

## 2020-09-24 RX ORDER — TORSEMIDE 20 MG/1
TABLET ORAL
Qty: 30 TABLET | Refills: 5 | Status: SHIPPED | OUTPATIENT
Start: 2020-09-24 | End: 2020-12-10

## 2020-09-29 DIAGNOSIS — K21.9 GASTROESOPHAGEAL REFLUX DISEASE WITHOUT ESOPHAGITIS: ICD-10-CM

## 2020-09-29 DIAGNOSIS — Z78.9 TAKES DIETARY SUPPLEMENTS: ICD-10-CM

## 2020-09-29 DIAGNOSIS — E78.5 HYPERLIPIDEMIA, UNSPECIFIED HYPERLIPIDEMIA TYPE: ICD-10-CM

## 2020-09-29 DIAGNOSIS — E11.65 UNCONTROLLED TYPE 2 DIABETES MELLITUS WITH HYPERGLYCEMIA, WITHOUT LONG-TERM CURRENT USE OF INSULIN (HCC): ICD-10-CM

## 2020-09-29 DIAGNOSIS — I10 ESSENTIAL HYPERTENSION: ICD-10-CM

## 2020-09-29 RX ORDER — MULTIVIT-MIN/FA/LYCOPEN/LUTEIN 500-300MCG
TABLET ORAL
Qty: 30 TABLET | Refills: 3 | Status: SHIPPED | OUTPATIENT
Start: 2020-09-29 | End: 2021-01-24

## 2020-09-29 RX ORDER — ASPIRIN 81 MG/1
TABLET ORAL
Qty: 30 TABLET | Refills: 3 | Status: SHIPPED | OUTPATIENT
Start: 2020-09-29 | End: 2021-01-24

## 2020-09-29 RX ORDER — GEMFIBROZIL 600 MG/1
TABLET, FILM COATED ORAL
Qty: 60 TABLET | Refills: 3 | Status: SHIPPED | OUTPATIENT
Start: 2020-09-29 | End: 2021-01-24

## 2020-09-29 RX ORDER — LISINOPRIL 5 MG/1
TABLET ORAL
Qty: 30 TABLET | Refills: 3 | Status: SHIPPED | OUTPATIENT
Start: 2020-09-29 | End: 2021-01-24

## 2020-09-29 RX ORDER — OMEPRAZOLE 20 MG/1
CAPSULE, DELAYED RELEASE ORAL
Qty: 30 CAPSULE | Refills: 3 | Status: SHIPPED | OUTPATIENT
Start: 2020-09-29 | End: 2021-01-24

## 2020-09-29 RX ORDER — GLIPIZIDE 5 MG/1
TABLET ORAL
Qty: 120 TABLET | Refills: 3 | Status: SHIPPED | OUTPATIENT
Start: 2020-09-29 | End: 2021-01-24

## 2020-10-02 DIAGNOSIS — E11.42 TYPE 2 DIABETES MELLITUS WITH DIABETIC POLYNEUROPATHY, WITHOUT LONG-TERM CURRENT USE OF INSULIN (HCC): ICD-10-CM

## 2020-10-02 DIAGNOSIS — E11.65 UNCONTROLLED TYPE 2 DIABETES MELLITUS WITH HYPERGLYCEMIA, WITHOUT LONG-TERM CURRENT USE OF INSULIN (HCC): ICD-10-CM

## 2020-10-02 RX ORDER — INSULIN GLARGINE 100 [IU]/ML
INJECTION, SOLUTION SUBCUTANEOUS
Qty: 10 ML | Refills: 5 | Status: SHIPPED | OUTPATIENT
Start: 2020-10-02 | End: 2020-12-07

## 2020-10-02 RX ORDER — LANCETS 28 GAUGE
EACH MISCELLANEOUS
Qty: 100 EACH | Refills: 5 | Status: SHIPPED | OUTPATIENT
Start: 2020-10-02 | End: 2021-03-12

## 2020-10-16 DIAGNOSIS — Z79.4 TYPE 2 DIABETES MELLITUS WITH DIABETIC NEUROPATHY, WITH LONG-TERM CURRENT USE OF INSULIN (HCC): ICD-10-CM

## 2020-10-16 DIAGNOSIS — E11.40 TYPE 2 DIABETES MELLITUS WITH DIABETIC NEUROPATHY, WITH LONG-TERM CURRENT USE OF INSULIN (HCC): ICD-10-CM

## 2020-10-28 DIAGNOSIS — R21 RASH: ICD-10-CM

## 2020-10-28 RX ORDER — NYSTATIN 100000 [USP'U]/G
POWDER TOPICAL
Qty: 60 G | Refills: 5 | Status: SHIPPED | OUTPATIENT
Start: 2020-10-28 | End: 2021-05-26

## 2020-11-12 DIAGNOSIS — E11.649 TYPE 2 DIABETES MELLITUS WITH HYPOGLYCEMIA WITHOUT COMA, WITH LONG-TERM CURRENT USE OF INSULIN (HCC): ICD-10-CM

## 2020-11-12 DIAGNOSIS — Z79.4 TYPE 2 DIABETES MELLITUS WITH HYPOGLYCEMIA WITHOUT COMA, WITH LONG-TERM CURRENT USE OF INSULIN (HCC): ICD-10-CM

## 2020-11-12 RX ORDER — SYRINGE AND NEEDLE,INSULIN,1ML 29 G X1/2"
SYRINGE, EMPTY DISPOSABLE MISCELLANEOUS
Qty: 100 EACH | Refills: 5 | Status: SHIPPED | OUTPATIENT
Start: 2020-11-12 | End: 2021-03-30

## 2020-11-16 LAB — HBA1C MFR BLD HPLC: 7.5 %

## 2020-12-07 DIAGNOSIS — E11.65 UNCONTROLLED TYPE 2 DIABETES MELLITUS WITH HYPERGLYCEMIA, WITHOUT LONG-TERM CURRENT USE OF INSULIN (HCC): ICD-10-CM

## 2020-12-07 RX ORDER — INSULIN GLARGINE 100 [IU]/ML
INJECTION, SOLUTION SUBCUTANEOUS
Qty: 10 ML | Refills: 2 | Status: SHIPPED | OUTPATIENT
Start: 2020-12-07 | End: 2021-02-04

## 2020-12-10 ENCOUNTER — TELEMEDICINE (OUTPATIENT)
Dept: GERIATRICS | Facility: CLINIC | Age: 85
End: 2020-12-10
Payer: MEDICARE

## 2020-12-10 DIAGNOSIS — G30.1 LATE ONSET ALZHEIMER'S DISEASE WITH BEHAVIORAL DISTURBANCE (HCC): ICD-10-CM

## 2020-12-10 DIAGNOSIS — E87.70 LOCALIZED EDEMA DUE TO FLUID OVERLOAD: Primary | ICD-10-CM

## 2020-12-10 DIAGNOSIS — F02.81 LATE ONSET ALZHEIMER'S DISEASE WITH BEHAVIORAL DISTURBANCE (HCC): ICD-10-CM

## 2020-12-10 DIAGNOSIS — E11.65 UNCONTROLLED TYPE 2 DIABETES MELLITUS WITH HYPERGLYCEMIA, WITHOUT LONG-TERM CURRENT USE OF INSULIN (HCC): Primary | ICD-10-CM

## 2020-12-10 DIAGNOSIS — Z79.4 TYPE 2 DIABETES MELLITUS WITH DIABETIC NEUROPATHY, WITH LONG-TERM CURRENT USE OF INSULIN (HCC): ICD-10-CM

## 2020-12-10 DIAGNOSIS — N18.31 STAGE 3A CHRONIC KIDNEY DISEASE (HCC): ICD-10-CM

## 2020-12-10 DIAGNOSIS — N30.00 ACUTE CYSTITIS WITHOUT HEMATURIA: ICD-10-CM

## 2020-12-10 DIAGNOSIS — E87.70 LOCALIZED EDEMA DUE TO FLUID OVERLOAD: ICD-10-CM

## 2020-12-10 DIAGNOSIS — E11.40 TYPE 2 DIABETES MELLITUS WITH DIABETIC NEUROPATHY, WITH LONG-TERM CURRENT USE OF INSULIN (HCC): ICD-10-CM

## 2020-12-10 PROBLEM — F03.91 DEMENTIA WITH BEHAVIORAL DISTURBANCE (HCC): Status: ACTIVE | Noted: 2020-05-07

## 2020-12-10 PROCEDURE — 99337 PR DOM/R-HOME E/M EST PT SIGNIF NEW PROB 60 MINUTES: CPT | Performed by: INTERNAL MEDICINE

## 2020-12-10 RX ORDER — TORSEMIDE 10 MG/1
TABLET ORAL
Qty: 30 TABLET | Refills: 5 | Status: SHIPPED | OUTPATIENT
Start: 2020-12-10 | End: 2021-03-31

## 2020-12-10 RX ORDER — TORSEMIDE 20 MG/1
10 TABLET ORAL DAILY
Qty: 30 TABLET | Refills: 5 | Status: SHIPPED | OUTPATIENT
Start: 2020-12-10 | End: 2021-03-31

## 2021-01-05 DIAGNOSIS — E11.65 CONTROLLED TYPE 2 DIABETES MELLITUS WITH HYPERGLYCEMIA, WITH LONG-TERM CURRENT USE OF INSULIN (HCC): ICD-10-CM

## 2021-01-05 DIAGNOSIS — Z79.4 CONTROLLED TYPE 2 DIABETES MELLITUS WITH HYPERGLYCEMIA, WITH LONG-TERM CURRENT USE OF INSULIN (HCC): ICD-10-CM

## 2021-01-05 RX ORDER — BLOOD-GLUCOSE METER
KIT MISCELLANEOUS
Qty: 100 EACH | Refills: 3 | Status: SHIPPED | OUTPATIENT
Start: 2021-01-05 | End: 2021-01-29

## 2021-01-22 DIAGNOSIS — G30.1 LATE ONSET ALZHEIMER'S DISEASE WITH BEHAVIORAL DISTURBANCE (HCC): Primary | ICD-10-CM

## 2021-01-22 DIAGNOSIS — F02.81 LATE ONSET ALZHEIMER'S DISEASE WITH BEHAVIORAL DISTURBANCE (HCC): Primary | ICD-10-CM

## 2021-01-22 DIAGNOSIS — R41.0 CONFUSION: ICD-10-CM

## 2021-01-22 DIAGNOSIS — F41.9 ANXIETY: ICD-10-CM

## 2021-01-22 RX ORDER — LORAZEPAM 0.5 MG/1
0.5 TABLET ORAL 2 TIMES DAILY PRN
Qty: 16 TABLET | Refills: 0 | Status: SHIPPED | OUTPATIENT
Start: 2021-01-22 | End: 2021-03-31

## 2021-01-23 DIAGNOSIS — E78.5 HYPERLIPIDEMIA, UNSPECIFIED HYPERLIPIDEMIA TYPE: ICD-10-CM

## 2021-01-23 DIAGNOSIS — K21.9 GASTROESOPHAGEAL REFLUX DISEASE WITHOUT ESOPHAGITIS: ICD-10-CM

## 2021-01-23 DIAGNOSIS — Z78.9 TAKES DIETARY SUPPLEMENTS: ICD-10-CM

## 2021-01-23 DIAGNOSIS — E11.65 UNCONTROLLED TYPE 2 DIABETES MELLITUS WITH HYPERGLYCEMIA, WITHOUT LONG-TERM CURRENT USE OF INSULIN (HCC): ICD-10-CM

## 2021-01-23 DIAGNOSIS — I10 ESSENTIAL HYPERTENSION: ICD-10-CM

## 2021-01-24 RX ORDER — OMEPRAZOLE 20 MG/1
CAPSULE, DELAYED RELEASE ORAL
Qty: 30 CAPSULE | Refills: 3 | Status: SHIPPED | OUTPATIENT
Start: 2021-01-24

## 2021-01-24 RX ORDER — GEMFIBROZIL 600 MG/1
TABLET, FILM COATED ORAL
Qty: 60 TABLET | Refills: 3 | Status: SHIPPED | OUTPATIENT
Start: 2021-01-24

## 2021-01-24 RX ORDER — GLIPIZIDE 5 MG/1
TABLET ORAL
Qty: 120 TABLET | Refills: 3 | Status: SHIPPED | OUTPATIENT
Start: 2021-01-24 | End: 2021-03-31

## 2021-01-24 RX ORDER — ASPIRIN 81 MG/1
TABLET ORAL
Qty: 30 TABLET | Refills: 3 | Status: SHIPPED | OUTPATIENT
Start: 2021-01-24

## 2021-01-24 RX ORDER — MULTIVIT-MIN/FA/LYCOPEN/LUTEIN 500-300MCG
TABLET ORAL
Qty: 30 TABLET | Refills: 3 | Status: SHIPPED | OUTPATIENT
Start: 2021-01-24 | End: 2021-11-29

## 2021-01-24 RX ORDER — LISINOPRIL 5 MG/1
TABLET ORAL
Qty: 30 TABLET | Refills: 3 | Status: SHIPPED | OUTPATIENT
Start: 2021-01-24 | End: 2021-06-11

## 2021-01-28 DIAGNOSIS — Z79.4 TYPE 2 DIABETES MELLITUS WITH DIABETIC NEUROPATHY, WITH LONG-TERM CURRENT USE OF INSULIN (HCC): ICD-10-CM

## 2021-01-28 DIAGNOSIS — E11.40 TYPE 2 DIABETES MELLITUS WITH DIABETIC NEUROPATHY, WITH LONG-TERM CURRENT USE OF INSULIN (HCC): ICD-10-CM

## 2021-01-29 DIAGNOSIS — E11.65 CONTROLLED TYPE 2 DIABETES MELLITUS WITH HYPERGLYCEMIA, WITH LONG-TERM CURRENT USE OF INSULIN (HCC): ICD-10-CM

## 2021-01-29 DIAGNOSIS — Z79.4 CONTROLLED TYPE 2 DIABETES MELLITUS WITH HYPERGLYCEMIA, WITH LONG-TERM CURRENT USE OF INSULIN (HCC): ICD-10-CM

## 2021-01-29 RX ORDER — BLOOD-GLUCOSE METER
KIT MISCELLANEOUS
Qty: 100 EACH | Refills: 3 | Status: SHIPPED | OUTPATIENT
Start: 2021-01-29 | End: 2021-05-06

## 2021-02-04 DIAGNOSIS — E11.65 UNCONTROLLED TYPE 2 DIABETES MELLITUS WITH HYPERGLYCEMIA, WITHOUT LONG-TERM CURRENT USE OF INSULIN (HCC): ICD-10-CM

## 2021-02-04 RX ORDER — INSULIN GLARGINE 100 [IU]/ML
INJECTION, SOLUTION SUBCUTANEOUS
Qty: 10 ML | Refills: 2 | Status: SHIPPED | OUTPATIENT
Start: 2021-02-04 | End: 2021-04-01

## 2021-02-07 DIAGNOSIS — L03.115 CELLULITIS OF RIGHT LOWER EXTREMITY: Primary | ICD-10-CM

## 2021-02-07 RX ORDER — CEPHALEXIN 500 MG/1
500 CAPSULE ORAL EVERY 12 HOURS SCHEDULED
Qty: 14 CAPSULE | Refills: 0 | Status: SHIPPED | OUTPATIENT
Start: 2021-02-07 | End: 2021-02-14

## 2021-03-04 DIAGNOSIS — Z79.4 TYPE 2 DIABETES MELLITUS WITH DIABETIC NEUROPATHY, WITH LONG-TERM CURRENT USE OF INSULIN (HCC): Primary | ICD-10-CM

## 2021-03-04 DIAGNOSIS — E11.40 TYPE 2 DIABETES MELLITUS WITH DIABETIC NEUROPATHY, WITH LONG-TERM CURRENT USE OF INSULIN (HCC): Primary | ICD-10-CM

## 2021-03-04 RX ORDER — UBIQUINOL 100 MG
CAPSULE ORAL
Qty: 100 EACH | Refills: 11 | Status: SHIPPED | OUTPATIENT
Start: 2021-03-04 | End: 2021-03-30

## 2021-03-12 DIAGNOSIS — E11.42 TYPE 2 DIABETES MELLITUS WITH DIABETIC POLYNEUROPATHY, WITHOUT LONG-TERM CURRENT USE OF INSULIN (HCC): ICD-10-CM

## 2021-03-12 DIAGNOSIS — N30.00 ACUTE CYSTITIS WITHOUT HEMATURIA: Primary | ICD-10-CM

## 2021-03-12 RX ORDER — CIPROFLOXACIN 500 MG/1
500 TABLET, FILM COATED ORAL EVERY 12 HOURS SCHEDULED
Qty: 14 TABLET | Refills: 0 | Status: SHIPPED | OUTPATIENT
Start: 2021-03-12 | End: 2021-03-19

## 2021-03-12 RX ORDER — LANCETS 28 GAUGE
EACH MISCELLANEOUS
Qty: 100 EACH | Refills: 5 | Status: SHIPPED | OUTPATIENT
Start: 2021-03-12 | End: 2021-05-26

## 2021-03-30 DIAGNOSIS — Z79.4 TYPE 2 DIABETES MELLITUS WITH DIABETIC NEUROPATHY, WITH LONG-TERM CURRENT USE OF INSULIN (HCC): ICD-10-CM

## 2021-03-30 DIAGNOSIS — Z79.4 TYPE 2 DIABETES MELLITUS WITH HYPOGLYCEMIA WITHOUT COMA, WITH LONG-TERM CURRENT USE OF INSULIN (HCC): ICD-10-CM

## 2021-03-30 DIAGNOSIS — E11.649 TYPE 2 DIABETES MELLITUS WITH HYPOGLYCEMIA WITHOUT COMA, WITH LONG-TERM CURRENT USE OF INSULIN (HCC): ICD-10-CM

## 2021-03-30 DIAGNOSIS — E11.40 TYPE 2 DIABETES MELLITUS WITH DIABETIC NEUROPATHY, WITH LONG-TERM CURRENT USE OF INSULIN (HCC): ICD-10-CM

## 2021-03-30 RX ORDER — SYRINGE AND NEEDLE,INSULIN,1ML 29 G X1/2"
SYRINGE, EMPTY DISPOSABLE MISCELLANEOUS
Qty: 100 EACH | Refills: 2 | Status: SHIPPED | OUTPATIENT
Start: 2021-03-30 | End: 2021-05-26

## 2021-03-30 RX ORDER — UBIQUINOL 100 MG
CAPSULE ORAL
Qty: 100 EACH | Refills: 11 | Status: SHIPPED | OUTPATIENT
Start: 2021-03-30 | End: 2021-05-26

## 2021-03-31 ENCOUNTER — OFFICE VISIT (OUTPATIENT)
Dept: GERIATRICS | Facility: CLINIC | Age: 86
End: 2021-03-31
Payer: MEDICARE

## 2021-03-31 DIAGNOSIS — Z79.4 CONTROLLED TYPE 2 DIABETES MELLITUS WITH HYPERGLYCEMIA, WITH LONG-TERM CURRENT USE OF INSULIN (HCC): ICD-10-CM

## 2021-03-31 DIAGNOSIS — E11.65 CONTROLLED TYPE 2 DIABETES MELLITUS WITH HYPERGLYCEMIA, WITH LONG-TERM CURRENT USE OF INSULIN (HCC): ICD-10-CM

## 2021-03-31 DIAGNOSIS — E11.65 UNCONTROLLED TYPE 2 DIABETES MELLITUS WITH HYPERGLYCEMIA, WITHOUT LONG-TERM CURRENT USE OF INSULIN (HCC): ICD-10-CM

## 2021-03-31 DIAGNOSIS — G30.1 LATE ONSET ALZHEIMER'S DISEASE WITH BEHAVIORAL DISTURBANCE (HCC): ICD-10-CM

## 2021-03-31 DIAGNOSIS — F02.81 LATE ONSET ALZHEIMER'S DISEASE WITH BEHAVIORAL DISTURBANCE (HCC): ICD-10-CM

## 2021-03-31 DIAGNOSIS — E87.70 LOCALIZED EDEMA DUE TO FLUID OVERLOAD: ICD-10-CM

## 2021-03-31 DIAGNOSIS — Z79.4 TYPE 2 DIABETES MELLITUS WITH DIABETIC NEUROPATHY, WITH LONG-TERM CURRENT USE OF INSULIN (HCC): ICD-10-CM

## 2021-03-31 DIAGNOSIS — F33.9 EPISODE OF RECURRENT MAJOR DEPRESSIVE DISORDER, UNSPECIFIED DEPRESSION EPISODE SEVERITY (HCC): ICD-10-CM

## 2021-03-31 DIAGNOSIS — L03.115 CELLULITIS OF RIGHT LOWER EXTREMITY: Primary | ICD-10-CM

## 2021-03-31 DIAGNOSIS — E11.40 TYPE 2 DIABETES MELLITUS WITH DIABETIC NEUROPATHY, WITH LONG-TERM CURRENT USE OF INSULIN (HCC): ICD-10-CM

## 2021-03-31 PROBLEM — L03.119 LOWER EXTREMITY CELLULITIS: Status: ACTIVE | Noted: 2021-03-25

## 2021-03-31 PROCEDURE — 99337 PR DOM/R-HOME E/M EST PT SIGNIF NEW PROB 60 MINUTES: CPT | Performed by: INTERNAL MEDICINE

## 2021-03-31 RX ORDER — TORSEMIDE 20 MG/1
20 TABLET ORAL DAILY
Qty: 30 TABLET | Refills: 5
Start: 2021-03-31 | End: 2021-04-21

## 2021-03-31 RX ORDER — OLANZAPINE 2.5 MG/1
2.5 TABLET ORAL DAILY PRN
COMMUNITY
End: 2021-04-22

## 2021-03-31 RX ORDER — CEPHALEXIN 500 MG/1
500 CAPSULE ORAL EVERY 6 HOURS SCHEDULED
COMMUNITY
End: 2021-04-05

## 2021-03-31 NOTE — ASSESSMENT & PLAN NOTE
Continues to have periods of crying but last time medications was started patient did not do well so family hesitant to start meds   Continue with supportive treatment

## 2021-03-31 NOTE — ASSESSMENT & PLAN NOTE
Discharged from the hospital on keflex will extend it another for 5 more days as staff report the leg looks worst today  Continue monitoring symptoms   Did have blood culture positive but thought to be contaminated and repeat blood cultures negative  Evaluated by ID in the hospital  Continue elevated leg as much as possible  Up dated the plan to grand daughter in person

## 2021-03-31 NOTE — ASSESSMENT & PLAN NOTE
Lab Results   Component Value Date    HGBA1C 8 0 (H) 03/26/2021     Blood sugars are elevated  Increase lantus to 10 units less the or equal to 150 but hold less then 100 and greater then 150 to give 48 units   Also icreased lunch humalog to 14units and dinner humalog to 8 units  Glipizide has been also discontinued   Continue monitoring blood sugars

## 2021-03-31 NOTE — PROGRESS NOTES
[facility]    NAME: Regla Sahu  AGE: 82 y o  SEX: female    DATE OF ENCOUNTER: 3/31/2021    Assessment and Plan   Lower extremity cellulitis  Discharged from the hospital on keflex will extend it another for 5 more days as staff report the leg looks worst today  Continue monitoring symptoms   Did have blood culture positive but thought to be contaminated and repeat blood cultures negative  Evaluated by ID in the hospital  Continue elevated leg as much as possible  Up dated the plan to grand daughter in person       Localized edema due to fluid overload  Increased torsemide to 40mg po daily x 1 week then go back down to 20mg po daily  The leg swelling also playing a role in not healing well the right leg  Continue monitoring leg swelling  Will get BMP in about 2 weeks  Depression  Continues to have periods of crying but last time medications was started patient did not do well so family hesitant to start meds   Continue with supportive treatment     Controlled type 2 diabetes mellitus with hyperglycemia, with long-term current use of insulin ([facility] )    Lab Results   Component Value Date    HGBA1C 8 0 (H) 03/26/2021     Blood sugars are elevated  Increase lantus to 10 units less the or equal to 150 but hold less then 100 and greater then 150 to give 48 units   Also icreased lunch humalog to 14units and dinner humalog to 8 units  Glipizide has been also discontinued   Continue monitoring blood sugars    Dementia with behavioral disturbance ([facility] )  Continues to decline  Continues to have behavior problem  Started on zyprexa in the hospital will continue for now and monitor symptoms   Continue with supportive care       Chief Complaint     No new complaint but limited by dementia     History of Present Illness     81 yo female seen after hospitalization and return back to assisted living  History obtained from grand daughter as patient cannot give proper details due to her dementia   As per grand daughter the patient right leg started to get worst with swelling and redness  She was also in pain at which time she was sent to the hospital where she was diagnosed with cellulitis and managed with IV antibiotics then switched over to oral  Reviewed hospital records they had also managed for dementia with behaviors  During the hospital stay her blood culture was positive but thought to be contaminated and repeat cultures were negative and was evaluated by ID also  Reviewed labs and reviewed DVT study which was negative  Once patient was stable she was discharged to [facility]  PMHx     Past Medical History:   Diagnosis Date    Anxiety     Dementia     Depression     DM2 [facility]di[facility] mellitus[facility] type 2) ([facility])     GERD (gastroesophageal reflux disease)     Hypertension     Obesity      Past Surgical History:   Procedure Laterality Date    TOTAL ABDOMINAL HYSTERECTOMY       No family history on file  Social History     Socioeconomic History    Marital status:       Spouse name: Not on file    Number of children: Not on file    Years of education: Not on file    Highest education level: Not on file   Occupational History    Not on file   Social Needs    Financial resource strain: Not on file    Food insecurity     Worry: Not on file     Inability: Not on file    Transportation needs     Medical: Not on file     Non-medical: Not on file   Tobacco Use    Smoking status: Former Smoker   Substance and Sexual Activity    Alcohol use: Not on file    Drug use: Not on file    Sexual activity: Not on file   Lifestyle    Physical activity     Days per week: Not on file     Minutes per session: Not on file    Stress: Not on file   Relationships    Social connections     Talks on phone: Not on file     Gets together: Not on file     Attends Mandaeism service: Not on file     Active member of club or organization: Not on file     Attends meetings of clubs or organizations: Not on file     Relationship status: Not on file    Intimate partner violence     Fear of current or ex partner: Not on file     Emotionally abused: Not on file     Physically abused: Not on file     Forced sexual activity: Not on file   Other Topics Concern    Not on file   Social History Narrative    Not on file     Allergies   Allergen Reactions    No Active Allergies        Review of Systems     Denies any pain   Does states feels sad because she is alone  All other review of system negative        Objective   Vital signs: (taken by me)  BP: 148/52  HR: 76  RR: 16  TEMP: 99 0F  SAT : 97% on RA    PHYSICAL EXAM:  GENERAL: no acute distress but in the middle started to cry when asked about her being depressed then stopped after a minute into different conversation  SKIN: warm, dry, no cyanosis, erythema of the right leg and slightly warm to touch  HEENT: normocephalic, atraumatic, no JVD, no Thyromegaly, no lymphadenopathy  LUNGS: CTA, no wheezing, no rales, expanded equally, no chest tenderness   HEART: normal rhythm, normal rate, systolic murmur, no gallop  ABDOMEN: soft non tender non distended bs+, no guarding or rebound tenderness  :  no suprapubic tenderness  MUSCULOSKELETAL: strength about 4+/5 all extremities, ROM within normal, bilateral lower leg edema pitting +2 but R>L  NEUROLOGY: awake, alert, oriented person only, CN2-12 intact     PSYCH: cooperative, depressed and flat affect      Pertinent Laboratory/Diagnostic Studies:  Recent labs and diagnostic tests reviewed in EMR    Current Medications   Medications reviewed in EMR and facility

## 2021-03-31 NOTE — ASSESSMENT & PLAN NOTE
Continues to decline  Continues to have behavior problem  Started on zyprexa in the hospital will continue for now and monitor symptoms   Continue with supportive care

## 2021-03-31 NOTE — ASSESSMENT & PLAN NOTE
Increased torsemide to 40mg po daily x 1 week then go back down to 20mg po daily  The leg swelling also playing a role in not healing well the right leg  Continue monitoring leg swelling  Will get BMP in about 2 weeks

## 2021-04-01 RX ORDER — INSULIN GLARGINE 100 [IU]/ML
INJECTION, SOLUTION SUBCUTANEOUS
Qty: 10 ML | Refills: 5 | Status: SHIPPED | OUTPATIENT
Start: 2021-04-01 | End: 2021-11-29

## 2021-04-15 ENCOUNTER — OFFICE VISIT (OUTPATIENT)
Dept: GERIATRICS | Facility: CLINIC | Age: 86
End: 2021-04-15
Payer: MEDICARE

## 2021-04-15 DIAGNOSIS — N18.31 STAGE 3A CHRONIC KIDNEY DISEASE (HCC): ICD-10-CM

## 2021-04-15 DIAGNOSIS — Z79.4 TYPE 2 DIABETES MELLITUS WITH DIABETIC NEUROPATHY, WITH LONG-TERM CURRENT USE OF INSULIN (HCC): ICD-10-CM

## 2021-04-15 DIAGNOSIS — E11.65 UNCONTROLLED TYPE 2 DIABETES MELLITUS WITH HYPERGLYCEMIA, WITHOUT LONG-TERM CURRENT USE OF INSULIN (HCC): Primary | ICD-10-CM

## 2021-04-15 DIAGNOSIS — E87.70 LOCALIZED EDEMA DUE TO FLUID OVERLOAD: ICD-10-CM

## 2021-04-15 DIAGNOSIS — E11.40 TYPE 2 DIABETES MELLITUS WITH DIABETIC NEUROPATHY, WITH LONG-TERM CURRENT USE OF INSULIN (HCC): ICD-10-CM

## 2021-04-15 DIAGNOSIS — L03.115 CELLULITIS OF RIGHT LOWER EXTREMITY: ICD-10-CM

## 2021-04-15 PROCEDURE — 99336 PR DOM/R-HOME E/M EST PT MOD HI SEVERITY 40 MINUTES: CPT | Performed by: INTERNAL MEDICINE

## 2021-04-15 NOTE — ASSESSMENT & PLAN NOTE
Completed keflex  Legs does not have any significant erythema   Continue monitoring for any signs of reinfection

## 2021-04-15 NOTE — PROGRESS NOTES
[facility]    NAME: Dulce Cockayne  AGE: 82 y o  SEX: female    DATE OF ENCOUNTER: 4/15/2021    Assessment and Plan   Localized edema due to fluid overload  Now torsemide down to 20mg po daily  Swelling improved but still has some   BMP reviewed Cr  Slightly up from baseline but down from last one 1 42 on 4/12/21  Continue to elevate legs   Did up discuss with grand daughter about the findings  Uncontrolled type 2 diabetes mellitus with hyperglycemia, without long-term current use of insulin ([facility])    Lab Results   Component Value Date    HGBA1C 8 0 (H) 03/26/2021     Blood sugars still high frequently, will increase humalog lunch to 16 units and dinner to 10 units  Continue all other current meds  Continue monitoring blood sugars     Chronic kidney disease (CKD), stage III (moderate) ([facility])  Cr is better from last one at 1 42 but still slightly elevated from before  Will repeat in about 2 weeks  Continue current meds    Cellulitis of right lower extremity  Completed keflex  Legs does not have any significant erythema   Continue monitoring for any signs of reinfection   Talked to grand daughter and up dated her about the visit  Chief Complaint     No new complaints but limited due to dementia     History of Present Illness     81 yo female seen for follow up after multiple medications adjustments on last visit after patient returned from the hospital  Staff have no new concerns  PMHx     Past Medical History:   Diagnosis Date    Anxiety     Dementia     Depression     DM2 (diabetes mellitus, type 2) ([facility])     GERD (gastroesophageal reflux disease)     Hypertension     Obesity      Past Surgical History:   Procedure Laterality Date    TOTAL ABDOMINAL HYSTERECTOMY       No family history on file  Social History     Socioeconomic History    Marital status:       Spouse name: Not on file    Number of children: Not on file    Years of education: Not on file    Highest education level: Not on file   Occupational History    Not on file   Social Needs    Financial resource strain: Not on file    Food insecurity     Worry: Not on file     Inability: Not on file    Transportation needs     Medical: Not on file     Non-medical: Not on file   Tobacco Use    Smoking status: Former Smoker   Substance and Sexual Activity    Alcohol use: Not on file    Drug use: Not on file    Sexual activity: Not on file   Lifestyle    Physical activity     Days per week: Not on file     Minutes per session: Not on file    Stress: Not on file   Relationships    Social connections     Talks on phone: Not on file     Gets together: Not on file     Attends Alevism service: Not on file     Active member of club or organization: Not on file     Attends meetings of clubs or organizations: Not on file     Relationship status: Not on file    Intimate partner violence     Fear of current or ex partner: Not on file     Emotionally abused: Not on file     Physically abused: Not on file     Forced sexual activity: Not on file   Other Topics Concern    Not on file   Social History Narrative    Not on file     Allergies   Allergen Reactions    No Active Allergies        Review of Systems     Denies any pain or shortness of breath but limited due to her dementia  All other review of system negative      Objective   Vital signs: (taken by me)  BP: 142/60  HR: 68  RR: 18  TEMP: 98 9F  SAT : 96% on RA    PHYSICAL EXAM:  GENERAL: no acute distress  SKIN: warm, dry,  no cyanosis, erythema of the legs almost fully resolved  HEENT: normocephalic, atraumatic, no JVD, no Thyromegaly, no lymphadenopathy  LUNGS: CTA, no wheezing, no rales, expanded equally, no chest tenderness   HEART: normal rhythm, normal rate, systolic murmur, no gallop  ABDOMEN: soft non tender non distended bs+, no guarding or rebound tenderness  :  no suprapubic tenderness  MUSCULOSKELETAL: strength about 4+/5 all extremities,bilateral lower leg edema, no calf tenderness  NEUROLOGY: awake, alert, CN2-12 intact  PSYCH: cooperative, pleasant         Pertinent Laboratory/Diagnostic Studies:  Recent labs and diagnostic tests reviewed in EMR    Current Medications   Medications reviewed in EMR and facility

## 2021-04-15 NOTE — ASSESSMENT & PLAN NOTE
Lab Results   Component Value Date    HGBA1C 8 0 (H) 03/26/2021     Blood sugars still high frequently, will increase humalog lunch to 16 units and dinner to 10 units  Continue all other current meds  Continue monitoring blood sugars

## 2021-04-15 NOTE — ASSESSMENT & PLAN NOTE
Cr is better from last one at 1 42 but still slightly elevated from before  Will repeat in about 2 weeks  Continue current meds

## 2021-04-15 NOTE — ASSESSMENT & PLAN NOTE
Now torsemide down to 20mg po daily  Swelling improved but still has some   BMP reviewed Cr  Slightly up from baseline but down from last one 1 42 on 4/12/21  Continue to elevate legs   Did up discuss with grand daughter about the findings

## 2021-04-21 DIAGNOSIS — E87.70 LOCALIZED EDEMA DUE TO FLUID OVERLOAD: ICD-10-CM

## 2021-04-21 RX ORDER — TORSEMIDE 20 MG/1
TABLET ORAL
Qty: 30 TABLET | Refills: 5 | Status: SHIPPED | OUTPATIENT
Start: 2021-04-21 | End: 2021-05-11

## 2021-04-22 DIAGNOSIS — G30.1 LATE ONSET ALZHEIMER'S DISEASE WITH BEHAVIORAL DISTURBANCE (HCC): Primary | ICD-10-CM

## 2021-04-22 DIAGNOSIS — F02.81 LATE ONSET ALZHEIMER'S DISEASE WITH BEHAVIORAL DISTURBANCE (HCC): Primary | ICD-10-CM

## 2021-04-22 RX ORDER — OLANZAPINE 2.5 MG/1
TABLET ORAL
Qty: 30 TABLET | Refills: 5 | Status: SHIPPED | OUTPATIENT
Start: 2021-04-22 | End: 2021-05-11

## 2021-05-04 DIAGNOSIS — Z79.4 TYPE 2 DIABETES MELLITUS WITH DIABETIC NEUROPATHY, WITH LONG-TERM CURRENT USE OF INSULIN (HCC): ICD-10-CM

## 2021-05-04 DIAGNOSIS — E11.40 TYPE 2 DIABETES MELLITUS WITH DIABETIC NEUROPATHY, WITH LONG-TERM CURRENT USE OF INSULIN (HCC): ICD-10-CM

## 2021-05-06 DIAGNOSIS — E11.65 CONTROLLED TYPE 2 DIABETES MELLITUS WITH HYPERGLYCEMIA, WITH LONG-TERM CURRENT USE OF INSULIN (HCC): ICD-10-CM

## 2021-05-06 DIAGNOSIS — Z79.4 CONTROLLED TYPE 2 DIABETES MELLITUS WITH HYPERGLYCEMIA, WITH LONG-TERM CURRENT USE OF INSULIN (HCC): ICD-10-CM

## 2021-05-06 RX ORDER — BLOOD-GLUCOSE METER
KIT MISCELLANEOUS
Qty: 100 STRIP | Refills: 3 | Status: SHIPPED | OUTPATIENT
Start: 2021-05-06 | End: 2021-05-26

## 2021-05-11 ENCOUNTER — NURSING HOME VISIT (OUTPATIENT)
Dept: GERIATRICS | Facility: OTHER | Age: 86
End: 2021-05-11
Payer: MEDICARE

## 2021-05-11 DIAGNOSIS — F33.9 EPISODE OF RECURRENT MAJOR DEPRESSIVE DISORDER, UNSPECIFIED DEPRESSION EPISODE SEVERITY (HCC): ICD-10-CM

## 2021-05-11 DIAGNOSIS — E11.65 CONTROLLED TYPE 2 DIABETES MELLITUS WITH HYPERGLYCEMIA, WITH LONG-TERM CURRENT USE OF INSULIN (HCC): ICD-10-CM

## 2021-05-11 DIAGNOSIS — F02.81 LATE ONSET ALZHEIMER'S DEMENTIA WITH BEHAVIORAL DISTURBANCE (HCC): Primary | ICD-10-CM

## 2021-05-11 DIAGNOSIS — Z79.4 CONTROLLED TYPE 2 DIABETES MELLITUS WITH HYPERGLYCEMIA, WITH LONG-TERM CURRENT USE OF INSULIN (HCC): ICD-10-CM

## 2021-05-11 DIAGNOSIS — I10 ESSENTIAL HYPERTENSION: ICD-10-CM

## 2021-05-11 DIAGNOSIS — G30.1 LATE ONSET ALZHEIMER'S DEMENTIA WITH BEHAVIORAL DISTURBANCE (HCC): Primary | ICD-10-CM

## 2021-05-11 DIAGNOSIS — N18.31 STAGE 3A CHRONIC KIDNEY DISEASE (HCC): ICD-10-CM

## 2021-05-11 DIAGNOSIS — E87.70 LOCALIZED EDEMA DUE TO FLUID OVERLOAD: ICD-10-CM

## 2021-05-11 PROCEDURE — 99306 1ST NF CARE HIGH MDM 50: CPT | Performed by: INTERNAL MEDICINE

## 2021-05-11 RX ORDER — TORSEMIDE 20 MG/1
20 TABLET ORAL DAILY
COMMUNITY

## 2021-05-11 NOTE — ASSESSMENT & PLAN NOTE
Continued decline  Moved to long term nursing home from assisted living  Continues with current meds  Continue with supportive care  Continue monitoring for behaviors

## 2021-05-11 NOTE — ASSESSMENT & PLAN NOTE
Continues to have periods of crying   Continue current meds  Continue with support meds  Tried meds before which patient did not do well

## 2021-05-11 NOTE — ASSESSMENT & PLAN NOTE
Continues to have problem with edema  Will increase torsemide for about a week to 40mg then go down to 20mg po daily   Continue to elevate legs  Discussed plan with grand daughter at bedside

## 2021-05-11 NOTE — ASSESSMENT & PLAN NOTE
BP elevated seems to be elevated   For now continue current meds  Will follow up with BP and adjust meds as needed  Continue monitoring BP

## 2021-05-11 NOTE — PROGRESS NOTES
[facility] notes  LONG TERM CARE      NAME: Corin Coronel  AGE: 82 y o  SEX: female    DATE OF ENCOUNTER: 5/11/2021    Assessment and Plan   Dementia with behavioral disturbance ([address] )  Continued decline  Moved to long term nursing home from assisted living  Continues with current meds  Continue with supportive care  Continue monitoring for behaviors      Controlled type 2 diabetes mellitus with hyperglycemia, with long-term current use of insulin ([facility])    Lab Results   Component Value Date    HGBA1C 8 0 (H) 03/26/2021     Continue current meds  Will monitor blood sugars closely and adjust meds  Continue CCD diet  Does have history of hypoglycemia and very high sugars      Chronic kidney disease (CKD), stage III (moderate) ([facility])  Will get BMP to monitor   Continue current meds      Depression  Continues to have periods of crying   Continue current meds  Continue with support meds  Tried meds before which patient did not do well    Essential hypertension  BP elevated seems to be elevated   For now continue current meds  Will follow up with BP and adjust meds as needed  Continue monitoring BP    Localized edema due to fluid overload  Continues to have problem with edema  Will increase torsemide for about a week to 40mg then go down to 20mg po daily   Continue to elevate legs  Discussed plan with grand daughter at bedside        Chief Complaint     No new complaints     History of Present Illness     83 yo female seen for admission to [address] [place] [address] for long term care  Patient cannot give details due to her dementia  Did discuss with grand daughter at bedside about patient since her last visit at assisted living what has been going on  She reports she is slowly declining requiring more care and wanted to be closer to family as a result was moved to [address] [place] [address]  Reviewed labs from before       PMHx     Past Medical History:   Diagnosis Date    Anxiety     Dementia     Depression     DM2 (diabetes mellitus, type 2) ([facility])     GERD (gastroesophageal reflux disease)     Hypertension     Obesity      Past Surgical History:   Procedure Laterality Date    TOTAL ABDOMINAL HYSTERECTOMY       No family history on file  Social History     Socioeconomic History    Marital status:       Spouse name: Not on file    Number of children: Not on file    Years of education: Not on file    Highest education level: Not on file   Occupational History    Not on file   Social Needs    Financial resource strain: Not on file    Food insecurity     Worry: Not on file     Inability: Not on file    Transportation needs     Medical: Not on file     Non-medical: Not on file   Tobacco Use    Smoking status: Former Smoker   Substance and Sexual Activity    Alcohol use: Not on file    Drug use: Not on file    Sexual activity: Not on file   Lifestyle    Physical activity     Days per week: Not on file     Minutes per session: Not on file    Stress: Not on file   Relationships    Social connections     Talks on phone: Not on file     Gets together: Not on file     Attends Zoroastrian service: Not on file     Active member of club or organization: Not on file     Attends meetings of clubs or organizations: Not on file     Relationship status: Not on file    Intimate partner violence     Fear of current or ex partner: Not on file     Emotionally abused: Not on file     Physically abused: Not on file     Forced sexual activity: Not on file   Other Topics Concern    Not on file   Social History Narrative    Not on file     Allergies   Allergen Reactions    No Active Allergies        Review of Systems     Denies any pain or shortness of breath but limited due to her dementia  All other review of system negative        Objective   Vital signs:  BP: 161/60  HR: 77  RR: 20  TEMP: 98 0F  SAT : 98% on RA    PHYSICAL EXAM:  GENERAL: no acute distress  SKIN: warm, dry, no rash, no cyanosis, chronic erythema of the legs with right more then left but does have hx of cellulitis will monitor closely  HEENT: normocephalic, atraumatic, no JVD, no Thyromegaly, no lymphadenopathy  LUNGS: CTA, no wheezing, no rales, expanded equally, no chest tenderness   HEART: normal rhythm, normal rate, systolic murmur, no gallop  ABDOMEN: soft non tender non distended bs+, no guarding or rebound tenderness  :  no suprapubic tenderness  MUSCULOSKELETAL: strength about 4+/5 all extremities but weaker lower extremities, bilateral lower leg edema pitting +1, no calf tenderness  NEUROLOGY: awake, alert, Oriented to person, and unable to recall 3 objects  CN2-12 intact  PSYCH: cooperative, pleasant  Pertinent Laboratory/Diagnostic Studies:  Recent labs and diagnostic tests reviewed in nursing home EMR    Current Medications   Medications reviewed and signed off on nursing home EMR          Eusebia Hernandez MD

## 2021-05-11 NOTE — ASSESSMENT & PLAN NOTE
Lab Results   Component Value Date    HGBA1C 8 0 (H) 03/26/2021     Continue current meds  Will monitor blood sugars closely and adjust meds  Continue CCD diet  Does have history of hypoglycemia and very high sugars

## 2021-05-15 ENCOUNTER — TELEPHONE (OUTPATIENT)
Dept: OTHER | Facility: OTHER | Age: 86
End: 2021-05-15

## 2021-05-21 ENCOUNTER — TELEPHONE (OUTPATIENT)
Dept: OTHER | Facility: OTHER | Age: 86
End: 2021-05-21

## 2021-05-26 ENCOUNTER — NURSING HOME VISIT (OUTPATIENT)
Dept: GERIATRICS | Facility: OTHER | Age: 86
End: 2021-05-26
Payer: MEDICARE

## 2021-05-26 DIAGNOSIS — E87.70 LOCALIZED EDEMA DUE TO FLUID OVERLOAD: ICD-10-CM

## 2021-05-26 DIAGNOSIS — E87.5 HYPERKALEMIA: ICD-10-CM

## 2021-05-26 DIAGNOSIS — N30.00 ACUTE CYSTITIS WITHOUT HEMATURIA: Primary | ICD-10-CM

## 2021-05-26 DIAGNOSIS — E11.65 UNCONTROLLED TYPE 2 DIABETES MELLITUS WITH HYPERGLYCEMIA, WITHOUT LONG-TERM CURRENT USE OF INSULIN (HCC): ICD-10-CM

## 2021-05-26 DIAGNOSIS — N18.31 STAGE 3A CHRONIC KIDNEY DISEASE (HCC): ICD-10-CM

## 2021-05-26 PROCEDURE — 99310 SBSQ NF CARE HIGH MDM 45: CPT | Performed by: INTERNAL MEDICINE

## 2021-05-26 RX ORDER — OLANZAPINE 2.5 MG/1
2.5 TABLET ORAL EVERY 12 HOURS
COMMUNITY

## 2021-05-26 NOTE — ASSESSMENT & PLAN NOTE
Started on amoxicillin and last dose 5/29/21  Seems to be better as per staff  Had fever and worsening confusion   Continue to encourage fluids

## 2021-05-26 NOTE — ASSESSMENT & PLAN NOTE
Blood sugars are better then before but still at times elevated  Continue current meds  Continue monitoring blood sugars  Continue with CCD diet

## 2021-05-26 NOTE — ASSESSMENT & PLAN NOTE
Cr  Elevated more then baseline  Will decrease torsemide to every other day  Continue monitoring symptoms   Repeat tomorrow

## 2021-05-26 NOTE — ASSESSMENT & PLAN NOTE
Which seems to be new since patient has been admitted here  Maybe some hemolysis  Will decrease diuretics  If continues to be elevated will stop ACEI    Continue monitoring for any side affects

## 2021-05-26 NOTE — PROGRESS NOTES
[facility] [address] notes  LONG TERM CARE      NAME: Ayush Low  AGE: 82 y o  SEX: female    DATE OF ENCOUNTER: 5/26/2021    Assessment and Plan   Acute cystitis  Started on amoxicillin and last dose 5/29/21  Seems to be better as per staff  Had fever and worsening confusion   Continue to encourage fluids    Uncontrolled type 2 diabetes mellitus with hyperglycemia, without long-term current use of insulin ([facility])  Blood sugars are better then before but still at times elevated  Continue current meds  Continue monitoring blood sugars  Continue with CCD diet     Localized edema due to fluid overload  Swelling is down also patient keeps leg up more often looks like  Will decreased torsemide to every other day due to worsening kidney and elevated k+  Continue monitoring symptoms    Hyperkalemia  Which seems to be new since patient has been admitted here  Maybe some hemolysis  Will decrease diuretics  If continues to be elevated will stop ACEI  Continue monitoring for any side affects    Chronic kidney disease (CKD), stage III (moderate) ([facility])  Cr  Elevated more then baseline  Will decrease torsemide to every other day  Continue monitoring symptoms   Repeat tomorrow      Chief Complaint     No new complaints    History of Present Illness     83 yo female seen for follow up  Patient seen for her UTI, hyperkalemia, leg swelling, CKDIII and DMII  Reviewed nursing notes since last visit  PMHx     Past Medical History:   Diagnosis Date    Anxiety     Dementia     Depression     DM2 (diabetes mellitus, type 2) ([facility])     GERD (gastroesophageal reflux disease)     Hypertension     Obesity      Past Surgical History:   Procedure Laterality Date    TOTAL ABDOMINAL HYSTERECTOMY       No family history on file  Social History     Socioeconomic History    Marital status:       Spouse name: Not on file    Number of children: Not on file    Years of education: Not on file    Highest education level: Not on file   Occupational History    Not on file   Social Needs    Financial resource strain: Not on file    Food insecurity     Worry: Not on file     Inability: Not on file    Transportation needs     Medical: Not on file     Non-medical: Not on file   Tobacco Use    Smoking status: Former Smoker   Substance and Sexual Activity    Alcohol use: Not on file    Drug use: Not on file    Sexual activity: Not on file   Lifestyle    Physical activity     Days per week: Not on file     Minutes per session: Not on file    Stress: Not on file   Relationships    Social connections     Talks on phone: Not on file     Gets together: Not on file     Attends Jainism service: Not on file     Active member of club or organization: Not on file     Attends meetings of clubs or organizations: Not on file     Relationship status: Not on file    Intimate partner violence     Fear of current or ex partner: Not on file     Emotionally abused: Not on file     Physically abused: Not on file     Forced sexual activity: Not on file   Other Topics Concern    Not on file   Social History Narrative    Not on file     Allergies   Allergen Reactions    No Active Allergies        Review of Systems     Denies any pain but seems to cry intermittently during visit  Also when dressing was open to look at skin tear patient was screaming and cyring  All other review of system negative      Objective   Vital signs:  BP: 121/43  HR: 61  RR: 16  TEMP: 98 2F  SAT : 95% on RA    PHYSICAL EXAM:  GENERAL: no acute distress  SKIN: warm, dry, no cyanosis, chronic skin changes of the lower extremities from swelling, also small skin tear on the right leg  HEENT: normocephalic, atraumatic, no JVD, no Thyromegaly, no lymphadenopathy  LUNGS: CTA, no wheezing, no rales, expanded equally, no chest tenderness   HEART: normal rhythm, normal rate, systolic murmur, no gallop  ABDOMEN: soft non tender non distended bs+, no guarding or rebound tenderness  :  no suprapubic tenderness  MUSCULOSKELETAL: strength about 4+/5 all extremities, ROM within normal, bilateral lower leg edema, no calf tenderness  NEUROLOGY: awake, alert, CN2-12 intact     PSYCH: cooperative, depressed      Pertinent Laboratory/Diagnostic Studies:  Recent labs and diagnostic tests reviewed in nursing home EMR    Current Medications   Medications reviewed      Guilherme Jeffers MD

## 2021-05-26 NOTE — ASSESSMENT & PLAN NOTE
Swelling is down also patient keeps leg up more often looks like  Will decreased torsemide to every other day due to worsening kidney and elevated k+  Continue monitoring symptoms

## 2021-06-11 ENCOUNTER — NURSING HOME VISIT (OUTPATIENT)
Dept: GERIATRICS | Facility: OTHER | Age: 86
End: 2021-06-11
Payer: MEDICARE

## 2021-06-11 DIAGNOSIS — E87.5 HYPERKALEMIA: ICD-10-CM

## 2021-06-11 DIAGNOSIS — N18.31 STAGE 3A CHRONIC KIDNEY DISEASE (HCC): ICD-10-CM

## 2021-06-11 DIAGNOSIS — F02.81 LATE ONSET ALZHEIMER'S DEMENTIA WITH BEHAVIORAL DISTURBANCE (HCC): Primary | ICD-10-CM

## 2021-06-11 DIAGNOSIS — G30.1 LATE ONSET ALZHEIMER'S DEMENTIA WITH BEHAVIORAL DISTURBANCE (HCC): Primary | ICD-10-CM

## 2021-06-11 PROCEDURE — 99309 SBSQ NF CARE MODERATE MDM 30: CPT | Performed by: INTERNAL MEDICINE

## 2021-06-11 RX ORDER — LANOLIN ALCOHOL/MO/W.PET/CERES
3 CREAM (GRAM) TOPICAL
COMMUNITY

## 2021-06-11 NOTE — PROGRESS NOTES
[facility] [address] notes  LONG TERM CARE       NAME: Amari Washington  AGE: 82 y o  SEX: female    DATE OF ENCOUNTER: 6/11/2021    Assessment and Plan   Dementia with behavioral disturbance ([address] )  Continue to have on and off behaviors and difficulty   olazapine increased to 2 times a day  Continue all other meds  Continue with supportive care  Completed treatment for UTI    Hyperkalemia  Continued to have elevated hyperkalemia and now discontinued ACEI   Potassium level has improved last on 6/9/21 was 4 9      Chronic kidney disease (CKD), stage III (moderate) ([facility])  Cr also improved now at 1 38 but went as high as 1 83  Continue current meds  Continue monitoring       Chief Complaint     No new complaints    History of Present Illness     83 yo female seen for monthly visit and med renewal  Patient seen for dementia, hyperkalemia and CKDIII  Reviewed nursing notes since last visit  PMHx     Past Medical History:   Diagnosis Date    Anxiety     Dementia     Depression     DM2 (diabetes mellitus, type 2) ([facility])     GERD (gastroesophageal reflux disease)     Hypertension     Obesity      Past Surgical History:   Procedure Laterality Date    TOTAL ABDOMINAL HYSTERECTOMY       No family history on file  Social History     Socioeconomic History    Marital status:       Spouse name: Not on file    Number of children: Not on file    Years of education: Not on file    Highest education level: Not on file   Occupational History    Not on file   Social Needs    Financial resource strain: Not on file    Food insecurity     Worry: Not on file     Inability: Not on file    Transportation needs     Medical: Not on file     Non-medical: Not on file   Tobacco Use    Smoking status: Former Smoker   Substance and Sexual Activity    Alcohol use: Not on file    Drug use: Not on file    Sexual activity: Not on file   Lifestyle    Physical activity     Days per week: Not on file     Minutes per session: Not on file    Stress: Not on file   Relationships    Social connections     Talks on phone: Not on file     Gets together: Not on file     Attends Yarsani service: Not on file     Active member of club or organization: Not on file     Attends meetings of clubs or organizations: Not on file     Relationship status: Not on file    Intimate partner violence     Fear of current or ex partner: Not on file     Emotionally abused: Not on file     Physically abused: Not on file     Forced sexual activity: Not on file   Other Topics Concern    Not on file   Social History Narrative    Not on file     Allergies   Allergen Reactions    No Active Allergies        Review of Systems     Unable to get due to dementia      Objective   Vital signs:  BP: 171/55  HR: 65  RR: 16  TEMP: 97 9F    PHYSICAL EXAM:  GENERAL: no acute distress  SKIN: warm, dry, no rash, no cyanosis  HEENT: normocephalic, atraumatic, no JVD, no Thyromegaly, no lymphadenopathy  LUNGS: CTA, no wheezing, no rales, expanded equally, no chest tenderness   HEART: normal rhythm, normal rate, systolic murmur, no gallop  ABDOMEN: soft non tender non distended bs+, no guarding or rebound tenderness  :  no suprapubic tenderness  MUSCULOSKELETAL: strength about 4+/5 all extremities, ROM within normal, bilateral lower leg edema, no calf tenderness  NEUROLOGY: awake, alert, CN2-12 intact  PSYCH: cooperative, pleasant  Pertinent Laboratory/Diagnostic Studies:  Recent labs and diagnostic tests reviewed in nursing home EMR    Current Medications   Medications reviewed and signed off on nursing home EMR          Saul Her MD

## 2021-06-11 NOTE — ASSESSMENT & PLAN NOTE
Continue to have on and off behaviors and difficulty   olazapine increased to 2 times a day  Continue all other meds  Continue with supportive care  Completed treatment for UTI

## 2021-06-11 NOTE — ASSESSMENT & PLAN NOTE
Continued to have elevated hyperkalemia and now discontinued ACEI   Potassium level has improved last on 6/9/21 was 4 9

## 2021-07-19 ENCOUNTER — NURSING HOME VISIT (OUTPATIENT)
Dept: GERIATRICS | Facility: OTHER | Age: 86
End: 2021-07-19
Payer: MEDICARE

## 2021-07-19 DIAGNOSIS — N18.31 STAGE 3A CHRONIC KIDNEY DISEASE (HCC): ICD-10-CM

## 2021-07-19 DIAGNOSIS — E78.2 MIXED HYPERLIPIDEMIA: ICD-10-CM

## 2021-07-19 DIAGNOSIS — E11.65 UNCONTROLLED TYPE 2 DIABETES MELLITUS WITH HYPERGLYCEMIA, WITHOUT LONG-TERM CURRENT USE OF INSULIN (HCC): Primary | ICD-10-CM

## 2021-07-19 PROBLEM — E78.5 HYPERLIPIDEMIA: Status: ACTIVE | Noted: 2021-07-19

## 2021-07-19 PROCEDURE — 99309 SBSQ NF CARE MODERATE MDM 30: CPT | Performed by: INTERNAL MEDICINE

## 2021-07-19 RX ORDER — ACETAMINOPHEN 500 MG
1000 TABLET ORAL 2 TIMES DAILY
COMMUNITY
End: 2021-11-29

## 2021-07-19 NOTE — ASSESSMENT & PLAN NOTE
HbA1c is 7 7 which is acceptable range for patient  Continue current meds  Continue monitoring blood sugars  Continue diet control also

## 2021-07-19 NOTE — PROGRESS NOTES
[facility] [address] notes  LONG TERM CARE       NAME: Juice Manchester  AGE: 82 y o  SEX: female    DATE OF ENCOUNTER: 7/19/2021    Assessment and Plan   Uncontrolled type 2 diabetes mellitus with hyperglycemia, without long-term current use of insulin ([facility])  HbA1c is 7 7 which is acceptable range for patient  Continue current meds  Continue monitoring blood sugars  Continue diet control also     Hyperlipidemia   slightly elevated  Cholesterol 212 slightly elevated  Currently average risk but if continues to be elevate will consider adding statins but will need to discuss with family about risk vs benefits   Continue lopid     Chronic kidney disease (CKD), stage III (moderate) ([facility])  Cr 1 42 on 6/25/21  Continue current meds  Continue monitoring       Chief Complaint     No new complaints    History of Present Illness     81 yo female seen for monthly visit and med renewal  Patient seen for DMII, HLD and CKDIII  Reviewed nursing notes since last visit  PMHx     Past Medical History:   Diagnosis Date    Anxiety     Dementia     Depression     DM2 (diabetes mellitus, type 2) ([facility])     GERD (gastroesophageal reflux disease)     Hypertension     Obesity      Past Surgical History:   Procedure Laterality Date    TOTAL ABDOMINAL HYSTERECTOMY       No family history on file  Social History     Socioeconomic History    Marital status:       Spouse name: Not on file    Number of children: Not on file    Years of education: Not on file    Highest education level: Not on file   Occupational History    Not on file   Tobacco Use    Smoking status: Former Smoker   Substance and Sexual Activity    Alcohol use: Not on file    Drug use: Not on file    Sexual activity: Not on file   Other Topics Concern    Not on file   Social History Narrative    Not on file     Social Determinants of Health     Financial Resource Strain:     Difficulty of Paying Living Expenses:    Food Insecurity:  Worried About [facility] in the Last Year:    [address] in the Last Year:    Transportation Needs:     Lack of Transportation (Medical):  Lack of Transportation (Non-Medical):    Physical Activity:     Days of Exercise per Week:     Minutes of Exercise per Session:    Stress:     Feeling of Stress :    Social Connections:     Frequency of Communication with Friends and Family:     Frequency of Social Gatherings with Friends and Family:     Attends Sabianism Services:     Active Member of Clubs or Organizations:     Attends Club or Organization Meetings:     Marital Status:    Intimate Partner Violence:     Fear of Current or Ex-Partner:     Emotionally Abused:     Physically Abused:     Sexually Abused: Allergies   Allergen Reactions    No Active Allergies        Review of Systems     Denies any pain or shortness of breath but history limited due to cognition  All other review of system negative      Objective   Vital signs:  BP: 138/78  HR: 78  RR: 18  TEMP: 97 8F    PHYSICAL EXAM:  GENERAL: no acute distress  SKIN: warm, dry, no rash, no cyanosis  HEENT: normocephalic, atraumatic, no JVD, no Thyromegaly, no lymphadenopathy  LUNGS: CTA, no wheezing, no rales, expanded equally, no chest tenderness   HEART: normal rhythm, normal rate, systolic murmur, no gallop  ABDOMEN: soft non tender non distended bs+, no guarding or rebound tenderness  :  no suprapubic tenderness  MUSCULOSKELETAL: strength about 4+/5 all extremities, ROM within normal, bilateral lower leg edema, no calf tenderness  NEUROLOGY: awake, alert, CN2-12 intact  PSYCH: cooperative, pleasant  Pertinent Laboratory/Diagnostic Studies:  Recent labs and diagnostic tests reviewed in nursing home EMR    Current Medications   Medications reviewed and signed off on nursing home EMR          Philip Rizvi MD

## 2021-07-19 NOTE — ASSESSMENT & PLAN NOTE
slightly elevated  Cholesterol 212 slightly elevated  Currently average risk but if continues to be elevate will consider adding statins but will need to discuss with family about risk vs benefits   Continue tomasd

## 2021-08-18 ENCOUNTER — NURSING HOME VISIT (OUTPATIENT)
Dept: GERIATRICS | Facility: OTHER | Age: 86
End: 2021-08-18
Payer: MEDICARE

## 2021-08-18 DIAGNOSIS — F33.9 EPISODE OF RECURRENT MAJOR DEPRESSIVE DISORDER, UNSPECIFIED DEPRESSION EPISODE SEVERITY (HCC): ICD-10-CM

## 2021-08-18 DIAGNOSIS — I10 ESSENTIAL HYPERTENSION: Primary | ICD-10-CM

## 2021-08-18 DIAGNOSIS — R26.2 AMBULATORY DYSFUNCTION: ICD-10-CM

## 2021-08-18 PROCEDURE — 99309 SBSQ NF CARE MODERATE MDM 30: CPT | Performed by: INTERNAL MEDICINE

## 2021-08-18 NOTE — PROGRESS NOTES
[facility] [address] notes  LONG TERM CARE       NAME: Laura Yo  AGE: 82 y o  SEX: female    DATE OF ENCOUNTER: 8/18/2021    Assessment and Plan   Essential hypertension  BP reviewed few elevated reading but mostly acceptable range upon review of the past 2 months  Continue current meds  Continue monitoring symptoms   Continue monitoring BP    Depression  Continues to be depressed  No thoughts of hurting herself  Has more family around now   Seems to be stable but does have intermittent break through crying and did not do well with meds last time    Ambulatory dysfunction  Continues to work with restorative  Seems to be doing well  Continue current meds  Continue with safety measures        Chief Complaint     No new complaints    History of Present Illness     83 yo female seen for monthly visit and med renewal  Patient seen for HTN, ambulatory dysfunction and depression  Reviewed nursing notes since last visit  PMHx     Past Medical History:   Diagnosis Date    Anxiety     Dementia     Depression     DM2 [facility]diabetes mellitus, type 2) ([facility])     GERD (gastroesophageal reflux disease)     Hypertension     Obesity      Past Surgical History:   Procedure Laterality Date    TOTAL ABDOMINAL HYSTERECTOMY       No family history on file  Social History     Socioeconomic History    Marital status:       Spouse name: Not on file    Number of children: Not on file    Years of education: Not on file    Highest education level: Not on file   Occupational History    Not on file   Tobacco Use    Smoking status: Former Smoker   Substance and Sexual Activity    Alcohol use: Not on file    Drug use: Not on file    Sexual activity: Not on file   Other Topics Concern    Not on file   Social History Narrative    Not on file     Social Determinants of Health     Financial Resource Strain:     Difficulty of Paying Living Expenses:    Food Insecurity:     Worried About Running Out of [employer] in the Last Year:    [address] in the Last Year:    Transportation Needs:     Lack of Transportation (Medical):  Lack of Transportation (Non-Medical):    Physical Activity:     Days of Exercise per Week:     Minutes of Exercise per Session:    Stress:     Feeling of Stress :    Social Connections:     Frequency of Communication with Friends and Family:     Frequency of Social Gatherings with Friends and Family:     Attends Scientologist Services:     Active Member of Clubs or Organizations:     Attends Club or Organization Meetings:     Marital Status:    Intimate Partner Violence:     Fear of Current or Ex-Partner:     Emotionally Abused:     Physically Abused:     Sexually Abused: Allergies   Allergen Reactions    No Active Allergies        Review of Systems     Unable to get due to dementia      Objective   Vital signs:  BP: 136/72  HR: 74  RR: 20  TEMP: 98 3F    PHYSICAL EXAM:  GENERAL: no acute distress  SKIN: warm, dry, no cyanosis, chronic venous stasis dermatitis   HEENT: normocephalic, atraumatic, no JVD, no Thyromegaly, no lymphadenopathy  LUNGS: CTA, no wheezing, no rales, expanded equally, no chest tenderness   HEART: normal rhythm, normal rate, systolic murmur, no gallop  ABDOMEN: soft non tender non distended bs+, no guarding or rebound tenderness  :  no suprapubic tenderness  MUSCULOSKELETAL: strength about 4+/5 all extremities, ROM within normal, bilateral lower leg edema, no calf tenderness  NEUROLOGY: awake, alert, CN2-12 intact  PSYCH: cooperative, depressed       Pertinent Laboratory/Diagnostic Studies:  Recent labs and diagnostic tests reviewed in nursing home EMR    Current Medications   Medications reviewed and signed off on nursing home EMR          Altamese Board MD

## 2021-08-18 NOTE — ASSESSMENT & PLAN NOTE
BP reviewed few elevated reading but mostly acceptable range upon review of the past 2 months  Continue current meds  Continue monitoring symptoms   Continue monitoring BP

## 2021-08-18 NOTE — ASSESSMENT & PLAN NOTE
Continues to work with restorative  Seems to be doing well  Continue current meds  Continue with safety measures

## 2021-08-18 NOTE — ASSESSMENT & PLAN NOTE
Continues to be depressed  No thoughts of hurting herself  Has more family around now   Seems to be stable but does have intermittent break through crying and did not do well with meds last time

## 2021-09-20 ENCOUNTER — NURSING HOME VISIT (OUTPATIENT)
Dept: GERIATRICS | Facility: OTHER | Age: 86
End: 2021-09-20
Payer: MEDICARE

## 2021-09-20 DIAGNOSIS — F02.81 LATE ONSET ALZHEIMER'S DEMENTIA WITH BEHAVIORAL DISTURBANCE (HCC): Primary | ICD-10-CM

## 2021-09-20 DIAGNOSIS — E87.70 LOCALIZED EDEMA DUE TO FLUID OVERLOAD: ICD-10-CM

## 2021-09-20 DIAGNOSIS — K21.9 GASTROESOPHAGEAL REFLUX DISEASE, UNSPECIFIED WHETHER ESOPHAGITIS PRESENT: ICD-10-CM

## 2021-09-20 DIAGNOSIS — G30.1 LATE ONSET ALZHEIMER'S DEMENTIA WITH BEHAVIORAL DISTURBANCE (HCC): Primary | ICD-10-CM

## 2021-09-20 PROCEDURE — 99309 SBSQ NF CARE MODERATE MDM 30: CPT | Performed by: INTERNAL MEDICINE

## 2021-09-20 NOTE — ASSESSMENT & PLAN NOTE
Continues to have behaviors  Continue current meds  Continues to decline  Continue with supportive care

## 2021-09-20 NOTE — ASSESSMENT & PLAN NOTE
Continues to have swelling   Continue current meds  Continue with elevation of legs  Continue monitoring symptoms

## 2021-09-20 NOTE — PROGRESS NOTES
[facility] [address] notes  LONG TERM CARE       NAME: Corky Forrest  AGE: 82 y o  SEX: female    DATE OF ENCOUNTER: 9/20/2021    Assessment and Plan   Dementia with behavioral disturbance ([address] )  Continues to have behaviors  Continue current meds  Continues to decline  Continue with supportive care     Localized edema due to fluid overload  Continues to have swelling   Continue current meds  Continue with elevation of legs  Continue monitoring symptoms     GERD (gastroesophageal reflux disease)  Continue PPI  Seems to be stable at present time  Continue monitoring symptoms         Chief Complaint     No new complaints    History of Present Illness     81 yo female seen for monthly visit and med renewal  Patient seen for dementia, GERD and edema  Reviewed nursing notes since last visit  PMHx     Past Medical History:   Diagnosis Date    Anxiety     Dementia     Depression     [facility]     GERD (gastroesophageal reflux disease)     Hypertension     Obesity      Past Surgical History:   Procedure Laterality Date    TOTAL ABDOMINAL HYSTERECTOMY       No family history on file  Social History     Socioeconomic History    Marital status:      Spouse name: Not on file    Number of children: Not on file    Years of education: Not on file    Highest education level: Not on file   Occupational History    Not on file   Tobacco Use    Smoking status: Former Smoker   Substance and Sexual Activity    Alcohol use: Not on file    Drug use: Not on file    Sexual activity: Not on file   Other Topics Concern    Not on file   Social History Narrative    Not on file     Social Determinants of Health     Financial Resource Strain:     Difficulty of Paying Living Expenses:    Food Insecurity:     Worried About Running Out of Food in the Last Year:     [address] in the Last Year:    Transportation Needs:     Lack of Transportation (Medical):      Lack of Transportation (Non-Medical):    Physical Activity:     Days of Exercise per Week:     Minutes of Exercise per Session:    Stress:     Feeling of Stress :    Social Connections:     Frequency of Communication with Friends and Family:     Frequency of Social Gatherings with Friends and Family:     Attends Rastafarian Services:     Active Member of Clubs or Organizations:     Attends Club or Organization Meetings:     Marital Status:    Intimate Partner Violence:     Fear of Current or Ex-Partner:     Emotionally Abused:     Physically Abused:     Sexually Abused: Allergies   Allergen Reactions    No Active Allergies        Review of Systems     Denies any shortness of breath or pain but limited hx due to dementia  All other review of system negative      Objective   Vital signs:  BP: 126/70  HR: 68  RR: 22  TEMP: 98 4F    PHYSICAL EXAM:  GENERAL: no acute distress  SKIN: warm, dry, no rash, no cyanosis  HEENT: normocephalic, atraumatic, no JVD, no Thyromegaly, no lymphadenopathy  LUNGS: CTA, no wheezing, no rales, expanded equally, no chest tenderness   HEART: normal rhythm, normal rate, systolic murmur, no gallop  ABDOMEN: soft non tender non distended bs+, no guarding or rebound tenderness  :  no suprapubic tenderness  MUSCULOSKELETAL: strength about 4+/5 all extremities, ROM within normal, bilateral lower leg edema, no calf tenderness  NEUROLOGY: awake, alert, CN2-12 intact  PSYCH: cooperative, pleasant  Pertinent Laboratory/Diagnostic Studies:  Recent labs and diagnostic tests reviewed in nursing home EMR    Current Medications   Medications reviewed and signed off on nursing home EMR          Laura Mccormick MD

## 2021-09-29 ENCOUNTER — TELEPHONE (OUTPATIENT)
Dept: OTHER | Facility: OTHER | Age: 86
End: 2021-09-29

## 2021-10-19 ENCOUNTER — NURSING HOME VISIT (OUTPATIENT)
Dept: GERIATRICS | Facility: OTHER | Age: 86
End: 2021-10-19
Payer: MEDICARE

## 2021-10-19 DIAGNOSIS — F51.01 PRIMARY INSOMNIA: ICD-10-CM

## 2021-10-19 DIAGNOSIS — E11.65 UNCONTROLLED TYPE 2 DIABETES MELLITUS WITH HYPERGLYCEMIA, WITHOUT LONG-TERM CURRENT USE OF INSULIN (HCC): Primary | ICD-10-CM

## 2021-10-19 DIAGNOSIS — M19.90 ARTHRITIS: ICD-10-CM

## 2021-10-19 PROCEDURE — 99309 SBSQ NF CARE MODERATE MDM 30: CPT | Performed by: INTERNAL MEDICINE

## 2021-11-17 ENCOUNTER — NURSING HOME VISIT (OUTPATIENT)
Dept: GERIATRICS | Facility: OTHER | Age: 86
End: 2021-11-17
Payer: MEDICARE

## 2021-11-17 DIAGNOSIS — I10 ESSENTIAL HYPERTENSION: ICD-10-CM

## 2021-11-17 DIAGNOSIS — N30.00 ACUTE CYSTITIS WITHOUT HEMATURIA: Primary | ICD-10-CM

## 2021-11-17 DIAGNOSIS — F33.9 EPISODE OF RECURRENT MAJOR DEPRESSIVE DISORDER, UNSPECIFIED DEPRESSION EPISODE SEVERITY (HCC): ICD-10-CM

## 2021-11-17 PROCEDURE — 99309 SBSQ NF CARE MODERATE MDM 30: CPT | Performed by: INTERNAL MEDICINE

## 2021-11-17 RX ORDER — LISINOPRIL 5 MG/1
5 TABLET ORAL DAILY
COMMUNITY
End: 2021-11-17

## 2021-11-17 RX ORDER — AMLODIPINE BESYLATE 2.5 MG/1
5 TABLET ORAL DAILY
COMMUNITY

## 2021-11-27 ENCOUNTER — TELEPHONE (OUTPATIENT)
Dept: OTHER | Facility: OTHER | Age: 86
End: 2021-11-27

## 2021-11-29 ENCOUNTER — NURSING HOME VISIT (OUTPATIENT)
Dept: GERIATRICS | Facility: OTHER | Age: 86
End: 2021-11-29
Payer: MEDICARE

## 2021-11-29 DIAGNOSIS — E11.40 TYPE 2 DIABETES MELLITUS WITH DIABETIC NEUROPATHY, WITH LONG-TERM CURRENT USE OF INSULIN (HCC): ICD-10-CM

## 2021-11-29 DIAGNOSIS — R78.81 E COLI BACTEREMIA: Primary | ICD-10-CM

## 2021-11-29 DIAGNOSIS — R65.20 SEPSIS DUE TO ESCHERICHIA COLI WITH ACUTE RENAL FAILURE WITHOUT SEPTIC SHOCK, UNSPECIFIED ACUTE RENAL FAILURE TYPE (HCC): ICD-10-CM

## 2021-11-29 DIAGNOSIS — N17.9 AKI (ACUTE KIDNEY INJURY) (HCC): ICD-10-CM

## 2021-11-29 DIAGNOSIS — N17.9 SEPSIS DUE TO ESCHERICHIA COLI WITH ACUTE RENAL FAILURE WITHOUT SEPTIC SHOCK, UNSPECIFIED ACUTE RENAL FAILURE TYPE (HCC): ICD-10-CM

## 2021-11-29 DIAGNOSIS — A41.51 SEPSIS DUE TO ESCHERICHIA COLI WITH ACUTE RENAL FAILURE WITHOUT SEPTIC SHOCK, UNSPECIFIED ACUTE RENAL FAILURE TYPE (HCC): ICD-10-CM

## 2021-11-29 DIAGNOSIS — R26.2 AMBULATORY DYSFUNCTION: ICD-10-CM

## 2021-11-29 DIAGNOSIS — Z79.4 UNCONTROLLED DIABETES MELLITUS WITH HYPERGLYCEMIA, WITH LONG-TERM CURRENT USE OF INSULIN (HCC): ICD-10-CM

## 2021-11-29 DIAGNOSIS — E11.65 UNCONTROLLED TYPE 2 DIABETES MELLITUS WITH HYPERGLYCEMIA, WITHOUT LONG-TERM CURRENT USE OF INSULIN (HCC): ICD-10-CM

## 2021-11-29 DIAGNOSIS — B96.20 E COLI BACTEREMIA: Primary | ICD-10-CM

## 2021-11-29 DIAGNOSIS — E11.65 UNCONTROLLED DIABETES MELLITUS WITH HYPERGLYCEMIA, WITH LONG-TERM CURRENT USE OF INSULIN (HCC): ICD-10-CM

## 2021-11-29 DIAGNOSIS — Z79.4 TYPE 2 DIABETES MELLITUS WITH DIABETIC NEUROPATHY, WITH LONG-TERM CURRENT USE OF INSULIN (HCC): ICD-10-CM

## 2021-11-29 PROBLEM — A41.9 SEPSIS (HCC): Status: ACTIVE | Noted: 2021-11-23

## 2021-11-29 PROBLEM — N39.0 UTI (URINARY TRACT INFECTION): Status: ACTIVE | Noted: 2021-11-23

## 2021-11-29 PROCEDURE — 99306 1ST NF CARE HIGH MDM 50: CPT | Performed by: INTERNAL MEDICINE

## 2021-11-29 RX ORDER — INSULIN GLARGINE 100 [IU]/ML
8 INJECTION, SOLUTION SUBCUTANEOUS DAILY
Start: 2021-11-29 | End: 2021-12-17

## 2021-12-05 ENCOUNTER — TELEPHONE (OUTPATIENT)
Dept: OTHER | Facility: OTHER | Age: 86
End: 2021-12-05

## 2021-12-17 ENCOUNTER — NURSING HOME VISIT (OUTPATIENT)
Dept: GERIATRICS | Facility: OTHER | Age: 86
End: 2021-12-17
Payer: MEDICARE

## 2021-12-17 DIAGNOSIS — E11.65 UNCONTROLLED DIABETES MELLITUS WITH HYPERGLYCEMIA, WITH LONG-TERM CURRENT USE OF INSULIN (HCC): ICD-10-CM

## 2021-12-17 DIAGNOSIS — G30.1 LATE ONSET ALZHEIMER'S DEMENTIA WITH BEHAVIORAL DISTURBANCE (HCC): Primary | ICD-10-CM

## 2021-12-17 DIAGNOSIS — F02.81 LATE ONSET ALZHEIMER'S DEMENTIA WITH BEHAVIORAL DISTURBANCE (HCC): Primary | ICD-10-CM

## 2021-12-17 DIAGNOSIS — K21.9 GASTROESOPHAGEAL REFLUX DISEASE, UNSPECIFIED WHETHER ESOPHAGITIS PRESENT: ICD-10-CM

## 2021-12-17 DIAGNOSIS — Z79.4 UNCONTROLLED DIABETES MELLITUS WITH HYPERGLYCEMIA, WITH LONG-TERM CURRENT USE OF INSULIN (HCC): ICD-10-CM

## 2021-12-17 DIAGNOSIS — E11.65 UNCONTROLLED TYPE 2 DIABETES MELLITUS WITH HYPERGLYCEMIA, WITHOUT LONG-TERM CURRENT USE OF INSULIN (HCC): ICD-10-CM

## 2021-12-17 PROCEDURE — 99309 SBSQ NF CARE MODERATE MDM 30: CPT | Performed by: INTERNAL MEDICINE

## 2021-12-17 RX ORDER — INSULIN GLARGINE 100 [IU]/ML
26 INJECTION, SOLUTION SUBCUTANEOUS DAILY
COMMUNITY

## 2021-12-17 RX ORDER — ACETAMINOPHEN 500 MG
1000 TABLET ORAL 2 TIMES DAILY
COMMUNITY

## 2022-01-24 ENCOUNTER — NURSING HOME VISIT (OUTPATIENT)
Dept: GERIATRICS | Facility: OTHER | Age: 87
End: 2022-01-24
Payer: MEDICARE

## 2022-01-24 DIAGNOSIS — E87.70 LOCALIZED EDEMA DUE TO FLUID OVERLOAD: ICD-10-CM

## 2022-01-24 DIAGNOSIS — E11.65 UNCONTROLLED DIABETES MELLITUS WITH HYPERGLYCEMIA, WITH LONG-TERM CURRENT USE OF INSULIN (HCC): Primary | ICD-10-CM

## 2022-01-24 DIAGNOSIS — Z79.4 UNCONTROLLED DIABETES MELLITUS WITH HYPERGLYCEMIA, WITH LONG-TERM CURRENT USE OF INSULIN (HCC): Primary | ICD-10-CM

## 2022-01-24 DIAGNOSIS — E78.2 MIXED HYPERLIPIDEMIA: ICD-10-CM

## 2022-01-24 PROCEDURE — 99309 SBSQ NF CARE MODERATE MDM 30: CPT | Performed by: INTERNAL MEDICINE

## 2022-01-24 RX ORDER — MIRTAZAPINE 7.5 MG/1
7.5 TABLET, FILM COATED ORAL
COMMUNITY

## 2022-01-24 NOTE — PROGRESS NOTES
[facility] [address] notes  LONG TERM CARE       NAME: Kandra Osgood  AGE: 82 y o  SEX: female    DATE OF ENCOUNTER: 1/24/2022    Assessment and Plan   Uncontrolled diabetes mellitus with hyperglycemia, with long-term current use of insulin ([facility])  HbA1c slightly up from before at 8 0 on 1/20/22  Blood sugars have been elevated now increased lantus to 26units late last month and also added humalog scheduled at lunch and dinner  Continue all other meds  Continue monitoring blood sugars  Still not that compliant with diet  Continue with [facility] diet    Hyperlipidemia  Lipid panel still showing triglyceride 196  Awaiting family to decide about starting low dose of zocor 20mg po daily and recheck lipid panel in 3 months if family agreeable  Continue all other meds      Localized edema due to fluid overload  Swelling less  Continue current dose of medication  Continue monitoring symptoms  Elevate legs as much as possible         Chief Complaint     No new complaints    History of Present Illness     83 yo female seen for monthly visit and med renewal  Patient seen for HLD, edema and DMII  Reviewed nursing notes since last visit  PMHx     Past Medical History:   Diagnosis Date    Anxiety     Dementia     Depression     DM2 (diabetes mellitus, type 2) ([facility])     GERD (gastroesophageal reflux disease)     Hypertension     Obesity      Past Surgical History:   Procedure Laterality Date    TOTAL ABDOMINAL HYSTERECTOMY       No family history on file  Social History     Socioeconomic History    Marital status:       Spouse name: Not on file    Number of children: Not on file    Years of education: Not on file    Highest education level: Not on file   Occupational History    Not on file   Tobacco Use    Smoking status: Former Smoker    Smokeless tobacco: Not on file   Substance and Sexual Activity    Alcohol use: Not on file    Drug use: Not on file    Sexual activity: Not on file   Other Topics Concern    Not on file   Social History Narrative    Not on file     Social Determinants of Health     Financial Resource Strain: Not on file   Food Insecurity: Not on file   Transportation Needs: Not on file   Physical Activity: Not on file   Stress: Not on file   Social Connections: Not on file   Intimate Partner Violence: Not on file   Housing Stability: Not on file     Allergies   Allergen Reactions    No Active Allergies        Review of Systems     Shakes her head no to all questions   All other review of system negative      Objective   Vital signs:  BP: 128/72  HR: 75  RR: 18  TEMP: 97 5F    PHYSICAL EXAM:  GENERAL: no acute distress  SKIN: warm, dry, no rash, no cyanosis  HEENT: normocephalic, atraumatic, no JVD, no Thyromegaly, no lymphadenopathy  LUNGS: CTA, no wheezing, no rales, expanded equally, no chest tenderness   HEART: normal rhythm, normal rate, systolic murmur, no gallop  ABDOMEN: soft non tender non distended bs+, no guarding or rebound tenderness  :  no suprapubic tenderness  MUSCULOSKELETAL: strength about 4+/5 all extremities, ROM within normal, bilateral lower leg edema, no calf tenderness  NEUROLOGY: awake, alert, CN2-12 intact  PSYCH: cooperative, flat affect       Pertinent Laboratory/Diagnostic Studies:  Recent labs and diagnostic tests reviewed in nursing home EMR    Current Medications   Medications reviewed and signed off on nursing home EMR          Case Musa MD

## 2022-01-24 NOTE — ASSESSMENT & PLAN NOTE
Swelling less  Continue current dose of medication  Continue monitoring symptoms  Elevate legs as much as possible

## 2022-01-24 NOTE — ASSESSMENT & PLAN NOTE
Lipid panel still showing triglyceride 196  Awaiting family to decide about starting low dose of zocor 20mg po daily and recheck lipid panel in 3 months if family agreeable  Continue all other meds

## 2022-01-24 NOTE — ASSESSMENT & PLAN NOTE
HbA1c slightly up from before at 8 0 on 1/20/22  Blood sugars have been elevated now increased lantus to 26units late last month and also added humalog scheduled at lunch and dinner  Continue all other meds  Continue monitoring blood sugars  Still not that compliant with diet  Continue with [facility] diet

## 2022-02-13 ENCOUNTER — TELEPHONE (OUTPATIENT)
Dept: OTHER | Facility: OTHER | Age: 87
End: 2022-02-13

## 2022-02-14 ENCOUNTER — NURSING HOME VISIT (OUTPATIENT)
Dept: GERIATRICS | Facility: OTHER | Age: 87
End: 2022-02-14
Payer: MEDICARE

## 2022-02-14 DIAGNOSIS — R50.9 FEVER, UNSPECIFIED FEVER CAUSE: Primary | ICD-10-CM

## 2022-02-14 DIAGNOSIS — N17.9 AKI (ACUTE KIDNEY INJURY) (HCC): ICD-10-CM

## 2022-02-14 PROCEDURE — 99309 SBSQ NF CARE MODERATE MDM 30: CPT | Performed by: INTERNAL MEDICINE

## 2022-02-14 RX ORDER — SIMVASTATIN 20 MG
20 TABLET ORAL
COMMUNITY

## 2022-02-14 NOTE — ASSESSMENT & PLAN NOTE
WBC elevated at [address]  Started on rocephin already 1g daily x 6 days  Will follow up with CXR which is pending  Urine culture shows contamination  Patient also reported to be hypoxic and was placed on oxygen during the weekend  Continue to encourage fluids   Staff reports looking better today   Repeat CBC and BMP in 2 days

## 2022-02-14 NOTE — PROGRESS NOTES
[facility] notes  LONG TERM CARE      NAME: Jinny Palmer  AGE: 82 y o  SEX: female    DATE OF ENCOUNTER: 2/14/2022    Assessment and Plan   Fever  WBC elevated at [address]  Started on rocephin already 1g daily x 6 days  Will follow up with CXR which is pending  Urine culture shows contamination  Patient also reported to be hypoxic and was placed on oxygen during the weekend  Continue to encourage fluids   Staff reports looking better today   Repeat CBC and BMP in 2 days       FRANCO (acute kidney injury) ([facility] [address] )  Cr up to 1 84 from baseline  Will encourage fluids  Repeat BMP   Also discussed plan with daughter       Chief Complaint     No new complaints but limited due to dementia    History of Present Illness     81 yo female seen for follow up  Patient seen due to her Fever  As per staff the patient had a fever of 100 8F and was tachycardic (101)  She was also later found to have hypoxic and placed on oxygen  Oncall was contacted and had blood test drawn and started on antibiotics  Reviewed nursing notes since last visit  PMHx     Past Medical History:   Diagnosis Date    Anxiety     Dementia     Depression     [facility]     GERD (gastroesophageal reflux disease)     Hypertension     Obesity      Past Surgical History:   Procedure Laterality Date    TOTAL ABDOMINAL HYSTERECTOMY       No family history on file  Social History     Socioeconomic History    Marital status:       Spouse name: Not on file    Number of children: Not on file    Years of education: Not on file    Highest education level: Not on file   Occupational History    Not on file   Tobacco Use    Smoking status: Former Smoker    Smokeless tobacco: Not on file   Substance and Sexual Activity    Alcohol use: Not on file    Drug use: Not on file    Sexual activity: Not on file   Other Topics Concern    Not on file   Social History Narrative    Not on file     Social Determinants of Health     Financial Resource Strain: Not on file   Food Insecurity: Not on file   Transportation Needs: Not on file   Physical Activity: Not on file   Stress: Not on file   Social Connections: Not on file   Intimate Partner Violence: Not on file   Housing Stability: Not on file     Allergies   Allergen Reactions    No Active Allergies        Review of Systems     Unable to get due to dementia        Objective   Vital signs:  BP: 138/60  HR: 68  RR: 16  TEMP: 97 8F  SAT : 93% on 2L    PHYSICAL EXAM:  GENERAL: acutely ill appearing   SKIN: warm, dry, no rash, no cyanosis  HEENT: normocephalic, atraumatic, no JVD, no Thyromegaly, no lymphadenopathy  LUNGS: CTA, no wheezing, no rales, expanded equally, no chest tenderness   HEART: normal rhythm, normal rate, systolic murmur, no gallop  ABDOMEN: soft non tender non distended bs+, no guarding or rebound tenderness  :  no suprapubic tenderness  MUSCULOSKELETAL: strength about 4+/5 all extremities, ROM within normal, bilateral lower leg edema, no calf tenderness  NEUROLOGY: keeps eyes closed but opens eyes to physical and verbal stimuli  PSYCH: cooperative, pleasant         Pertinent Laboratory/Diagnostic Studies:  Recent labs and diagnostic tests reviewed in nursing home EMR    Current Medications   Medications reviewed       Melvin Brito MD

## 2022-03-16 ENCOUNTER — NURSING HOME VISIT (OUTPATIENT)
Dept: GERIATRICS | Facility: OTHER | Age: 87
End: 2022-03-16
Payer: MEDICARE

## 2022-03-16 DIAGNOSIS — F02.81 LATE ONSET ALZHEIMER'S DEMENTIA WITH BEHAVIORAL DISTURBANCE (HCC): Primary | ICD-10-CM

## 2022-03-16 DIAGNOSIS — G30.1 LATE ONSET ALZHEIMER'S DEMENTIA WITH BEHAVIORAL DISTURBANCE (HCC): Primary | ICD-10-CM

## 2022-03-16 DIAGNOSIS — I10 ESSENTIAL HYPERTENSION: ICD-10-CM

## 2022-03-16 DIAGNOSIS — F33.9 EPISODE OF RECURRENT MAJOR DEPRESSIVE DISORDER, UNSPECIFIED DEPRESSION EPISODE SEVERITY (HCC): ICD-10-CM

## 2022-03-16 PROCEDURE — 99309 SBSQ NF CARE MODERATE MDM 30: CPT | Performed by: INTERNAL MEDICINE

## 2022-03-16 NOTE — PROGRESS NOTES
[facility] [address] notes  LONG TERM CARE       NAME: Praveen Dumont  AGE: 82 y o  SEX: female    DATE OF ENCOUNTER: 3/16/2022    Assessment and Plan   Dementia with behavioral disturbance ([address] )  Continues to decline  Continues to have intermittent behaviors  Continues to require redirection  Continue current meds  Continues to require help with ADLs  At times does not want to do anything     Depression  Continues to have intermittent episodes of crying  Even with seeing family frequently  Continue with supportive care  Followed by psych reviewed recent recommendations     Essential hypertension  BP reviewed for the past few months from the facility records  Will increase norvasc to 5 mg po daily  Continue monitoring BP        Chief Complaint     No new complaints     History of Present Illness     83 yo female seen for monthly visit and med renewal  Patient seen for dementia, depression and HTN  Reviewed nursing notes since last visit  PMHx     Past Medical History:   Diagnosis Date    Anxiety     Dementia     Depression     DM2 (diabetes mellitus, type 2) ([facility])     GERD (gastroesophageal reflux disease)     Hypertension     Obesity      Past Surgical History:   Procedure Laterality Date    TOTAL ABDOMINAL HYSTERECTOMY       No family history on file  Social History     Socioeconomic History    Marital status:       Spouse name: Not on file    Number of children: Not on file    Years of education: Not on file    Highest education level: Not on file   Occupational History    Not on file   Tobacco Use    Smoking status: Former Smoker    Smokeless tobacco: Not on file   Substance and Sexual Activity    Alcohol use: Not on file    Drug use: Not on file    Sexual activity: Not on file   Other Topics Concern    Not on file   Social History Narrative    Not on file     Social Determinants of Health     Financial Resource Strain: Not on file   Food Insecurity: Not on file Transportation Needs: Not on file   Physical Activity: Not on file   Stress: Not on file   Social Connections: Not on file   Intimate Partner Violence: Not on file   Housing Stability: Not on file     Allergies   Allergen Reactions    No Active Allergies        Review of Systems     Unable to get due to dementia      Objective   Vital signs:  BP: 162/88  HR: 74  RR: 20  TEMP: 96 8F    PHYSICAL EXAM:  GENERAL: no acute distress, chronically ill appearing   SKIN: warm, dry, no rash, no cyanosis  HEENT: normocephalic, atraumatic, no JVD, no Thyromegaly, no lymphadenopathy  LUNGS: CTA, no wheezing, no rales, expanded equally, no chest tenderness   HEART: normal rhythm, normal rate, systolic murmur, no gallop  ABDOMEN: soft non tender non distended bs+, no guarding or rebound tenderness  :  no suprapubic tenderness  MUSCULOSKELETAL: strength about 4+/5 all extremities,  bilateral lower leg edema, no calf tenderness  NEUROLOGY: awake, alert, CN2-12 intact  PSYCH: cooperative      Pertinent Laboratory/Diagnostic Studies:  Recent labs and diagnostic tests reviewed in nursing home EMR    Current Medications   Medications reviewed and signed off on nursing home EMR          Melissa Baker MD

## 2022-03-16 NOTE — ASSESSMENT & PLAN NOTE
Continues to have intermittent episodes of crying  Even with seeing family frequently  Continue with supportive care  Followed by psych reviewed recent recommendations

## 2022-03-16 NOTE — ASSESSMENT & PLAN NOTE
Continues to decline  Continues to have intermittent behaviors  Continues to require redirection  Continue current meds  Continues to require help with ADLs  At times does not want to do anything

## 2022-03-16 NOTE — ASSESSMENT & PLAN NOTE
BP reviewed for the past few months from the facility records  Will increase norvasc to 5 mg po daily  Continue monitoring BP